# Patient Record
Sex: FEMALE | Race: OTHER | NOT HISPANIC OR LATINO
[De-identification: names, ages, dates, MRNs, and addresses within clinical notes are randomized per-mention and may not be internally consistent; named-entity substitution may affect disease eponyms.]

---

## 2017-04-19 ENCOUNTER — APPOINTMENT (OUTPATIENT)
Dept: CARDIOTHORACIC SURGERY | Facility: CLINIC | Age: 62
End: 2017-04-19

## 2017-04-19 VITALS
SYSTOLIC BLOOD PRESSURE: 123 MMHG | HEART RATE: 76 BPM | BODY MASS INDEX: 28.58 KG/M2 | OXYGEN SATURATION: 97 % | HEIGHT: 69 IN | RESPIRATION RATE: 18 BRPM | TEMPERATURE: 97.1 F | DIASTOLIC BLOOD PRESSURE: 60 MMHG | WEIGHT: 193 LBS

## 2017-10-07 ENCOUNTER — APPOINTMENT (OUTPATIENT)
Dept: CT IMAGING | Facility: CLINIC | Age: 62
End: 2017-10-07

## 2017-10-18 ENCOUNTER — APPOINTMENT (OUTPATIENT)
Dept: CARDIOTHORACIC SURGERY | Facility: CLINIC | Age: 62
End: 2017-10-18
Payer: COMMERCIAL

## 2017-10-18 VITALS
OXYGEN SATURATION: 96 % | RESPIRATION RATE: 18 BRPM | BODY MASS INDEX: 29.54 KG/M2 | SYSTOLIC BLOOD PRESSURE: 127 MMHG | HEART RATE: 78 BPM | TEMPERATURE: 97.8 F | DIASTOLIC BLOOD PRESSURE: 62 MMHG | WEIGHT: 200 LBS

## 2017-10-18 PROCEDURE — 99214 OFFICE O/P EST MOD 30 MIN: CPT

## 2018-10-11 RX ORDER — PREDNISONE 50 MG/1
50 TABLET ORAL
Qty: 3 | Refills: 0 | Status: COMPLETED | COMMUNITY
Start: 2017-10-04 | End: 2018-10-11

## 2018-10-11 RX ORDER — CAMPHOR 0.45 %
25 GEL (GRAM) TOPICAL
Qty: 2 | Refills: 0 | Status: COMPLETED | COMMUNITY
Start: 2017-10-04 | End: 2018-10-11

## 2018-10-24 ENCOUNTER — APPOINTMENT (OUTPATIENT)
Dept: CARDIOTHORACIC SURGERY | Facility: CLINIC | Age: 63
End: 2018-10-24
Payer: COMMERCIAL

## 2018-10-24 VITALS
BODY MASS INDEX: 28.65 KG/M2 | TEMPERATURE: 97 F | HEART RATE: 74 BPM | RESPIRATION RATE: 18 BRPM | DIASTOLIC BLOOD PRESSURE: 64 MMHG | OXYGEN SATURATION: 97 % | SYSTOLIC BLOOD PRESSURE: 115 MMHG | WEIGHT: 194 LBS

## 2018-10-24 PROCEDURE — 99213 OFFICE O/P EST LOW 20 MIN: CPT

## 2019-02-06 ENCOUNTER — APPOINTMENT (OUTPATIENT)
Dept: RHEUMATOLOGY | Facility: CLINIC | Age: 64
End: 2019-02-06

## 2019-02-27 ENCOUNTER — APPOINTMENT (OUTPATIENT)
Dept: RHEUMATOLOGY | Facility: CLINIC | Age: 64
End: 2019-02-27

## 2019-09-05 ENCOUNTER — APPOINTMENT (OUTPATIENT)
Dept: DERMATOLOGY | Facility: CLINIC | Age: 64
End: 2019-09-05
Payer: COMMERCIAL

## 2019-09-05 PROCEDURE — 17110 DESTRUCTION B9 LES UP TO 14: CPT

## 2019-09-05 PROCEDURE — 99203 OFFICE O/P NEW LOW 30 MIN: CPT | Mod: 25

## 2019-09-25 ENCOUNTER — APPOINTMENT (OUTPATIENT)
Dept: DERMATOLOGY | Facility: CLINIC | Age: 64
End: 2019-09-25
Payer: COMMERCIAL

## 2019-09-25 PROCEDURE — 99213 OFFICE O/P EST LOW 20 MIN: CPT | Mod: 25

## 2019-09-25 PROCEDURE — 17250 CHEM CAUT OF GRANLTJ TISSUE: CPT

## 2019-10-30 ENCOUNTER — APPOINTMENT (OUTPATIENT)
Dept: CARDIOTHORACIC SURGERY | Facility: CLINIC | Age: 64
End: 2019-10-30
Payer: COMMERCIAL

## 2019-10-30 VITALS
DIASTOLIC BLOOD PRESSURE: 59 MMHG | HEIGHT: 69 IN | HEART RATE: 82 BPM | TEMPERATURE: 96.3 F | RESPIRATION RATE: 19 BRPM | BODY MASS INDEX: 28.44 KG/M2 | SYSTOLIC BLOOD PRESSURE: 110 MMHG | WEIGHT: 192 LBS | OXYGEN SATURATION: 98 %

## 2019-10-30 PROCEDURE — 99214 OFFICE O/P EST MOD 30 MIN: CPT

## 2019-10-30 RX ORDER — CARBAMAZEPINE 100 MG/1
100 CAPSULE, EXTENDED RELEASE ORAL
Refills: 0 | Status: COMPLETED | COMMUNITY
Start: 2017-10-18 | End: 2019-10-30

## 2019-10-30 RX ORDER — KETOROLAC TROMETHAMINE 4 MG/ML
0.4 SOLUTION/ DROPS OPHTHALMIC TWICE DAILY
Qty: 1 | Refills: 1 | Status: ACTIVE | COMMUNITY
Start: 2019-10-30

## 2019-11-27 ENCOUNTER — APPOINTMENT (OUTPATIENT)
Dept: RHEUMATOLOGY | Facility: CLINIC | Age: 64
End: 2019-11-27

## 2020-06-03 ENCOUNTER — APPOINTMENT (OUTPATIENT)
Dept: OBGYN | Facility: CLINIC | Age: 65
End: 2020-06-03
Payer: COMMERCIAL

## 2020-06-03 VITALS
SYSTOLIC BLOOD PRESSURE: 100 MMHG | HEIGHT: 69 IN | BODY MASS INDEX: 28.29 KG/M2 | DIASTOLIC BLOOD PRESSURE: 50 MMHG | WEIGHT: 191 LBS

## 2020-06-03 DIAGNOSIS — Z01.419 ENCOUNTER FOR GYNECOLOGICAL EXAMINATION (GENERAL) (ROUTINE) W/OUT ABNORMAL FINDINGS: ICD-10-CM

## 2020-06-03 PROCEDURE — 99397 PER PM REEVAL EST PAT 65+ YR: CPT

## 2020-06-03 RX ORDER — CAMPHOR 0.45 %
25 GEL (GRAM) TOPICAL
Qty: 2 | Refills: 0 | Status: DISCONTINUED | COMMUNITY
Start: 2018-10-11 | End: 2020-06-03

## 2020-06-03 NOTE — PHYSICAL EXAM
[Acute Distress] : no acute distress [Alert] : alert [Mass] : no breast mass [Awake] : awake [Axillary LAD] : no axillary lymphadenopathy [Nipple Discharge] : no nipple discharge [Soft] : soft [Tender] : non tender [Oriented x3] : oriented to person, place, and time [Normal] : vagina [Cystocele] : a cystocele [Rectocele] : a rectocele [No Bleeding] : there was no active vaginal bleeding [Absent] : absent [Uterine Adnexae] : were not tender and not enlarged

## 2020-09-08 ENCOUNTER — APPOINTMENT (OUTPATIENT)
Dept: DERMATOLOGY | Facility: CLINIC | Age: 65
End: 2020-09-08

## 2020-10-28 ENCOUNTER — APPOINTMENT (OUTPATIENT)
Dept: CARDIOTHORACIC SURGERY | Facility: CLINIC | Age: 65
End: 2020-10-28
Payer: COMMERCIAL

## 2020-10-28 VITALS
HEIGHT: 69 IN | OXYGEN SATURATION: 95 % | BODY MASS INDEX: 28.58 KG/M2 | RESPIRATION RATE: 18 BRPM | WEIGHT: 193 LBS | DIASTOLIC BLOOD PRESSURE: 60 MMHG | TEMPERATURE: 96.8 F | HEART RATE: 74 BPM | SYSTOLIC BLOOD PRESSURE: 115 MMHG

## 2020-10-28 PROCEDURE — 99072 ADDL SUPL MATRL&STAF TM PHE: CPT

## 2020-10-28 PROCEDURE — 99214 OFFICE O/P EST MOD 30 MIN: CPT

## 2020-11-20 ENCOUNTER — EMERGENCY (EMERGENCY)
Facility: HOSPITAL | Age: 65
LOS: 1 days | Discharge: HOME | End: 2020-11-20
Attending: EMERGENCY MEDICINE
Payer: COMMERCIAL

## 2020-11-20 VITALS
RESPIRATION RATE: 18 BRPM | OXYGEN SATURATION: 96 % | SYSTOLIC BLOOD PRESSURE: 100 MMHG | TEMPERATURE: 98 F | DIASTOLIC BLOOD PRESSURE: 58 MMHG | HEART RATE: 59 BPM

## 2020-11-20 VITALS
TEMPERATURE: 98 F | DIASTOLIC BLOOD PRESSURE: 69 MMHG | WEIGHT: 192.02 LBS | OXYGEN SATURATION: 96 % | RESPIRATION RATE: 18 BRPM | HEIGHT: 70 IN | SYSTOLIC BLOOD PRESSURE: 109 MMHG | HEART RATE: 80 BPM

## 2020-11-20 LAB
ALBUMIN SERPL ELPH-MCNC: 3.7 G/DL — SIGNIFICANT CHANGE UP (ref 3.3–5.2)
ALP SERPL-CCNC: 86 U/L — SIGNIFICANT CHANGE UP (ref 40–120)
ALT FLD-CCNC: 24 U/L — SIGNIFICANT CHANGE UP
ANION GAP SERPL CALC-SCNC: 9 MMOL/L — SIGNIFICANT CHANGE UP (ref 5–17)
APPEARANCE UR: CLEAR — SIGNIFICANT CHANGE UP
AST SERPL-CCNC: 26 U/L — SIGNIFICANT CHANGE UP
BACTERIA # UR AUTO: ABNORMAL
BASOPHILS # BLD AUTO: 0 K/UL — SIGNIFICANT CHANGE UP (ref 0–0.2)
BASOPHILS NFR BLD AUTO: 0 % — SIGNIFICANT CHANGE UP (ref 0–2)
BILIRUB SERPL-MCNC: 0.6 MG/DL — SIGNIFICANT CHANGE UP (ref 0.4–2)
BILIRUB UR-MCNC: NEGATIVE — SIGNIFICANT CHANGE UP
BUN SERPL-MCNC: 14 MG/DL — SIGNIFICANT CHANGE UP (ref 8–20)
CALCIUM SERPL-MCNC: 8.9 MG/DL — SIGNIFICANT CHANGE UP (ref 8.6–10.2)
CHLORIDE SERPL-SCNC: 101 MMOL/L — SIGNIFICANT CHANGE UP (ref 98–107)
CO2 SERPL-SCNC: 28 MMOL/L — SIGNIFICANT CHANGE UP (ref 22–29)
COLOR SPEC: YELLOW — SIGNIFICANT CHANGE UP
CREAT SERPL-MCNC: 0.68 MG/DL — SIGNIFICANT CHANGE UP (ref 0.5–1.3)
DIFF PNL FLD: ABNORMAL
EOSINOPHIL # BLD AUTO: 0 K/UL — SIGNIFICANT CHANGE UP (ref 0–0.5)
EOSINOPHIL NFR BLD AUTO: 0 % — SIGNIFICANT CHANGE UP (ref 0–6)
EPI CELLS # UR: SIGNIFICANT CHANGE UP
GLUCOSE SERPL-MCNC: 108 MG/DL — HIGH (ref 70–99)
GLUCOSE UR QL: NEGATIVE MG/DL — SIGNIFICANT CHANGE UP
HCT VFR BLD CALC: 40 % — SIGNIFICANT CHANGE UP (ref 34.5–45)
HGB BLD-MCNC: 12.5 G/DL — SIGNIFICANT CHANGE UP (ref 11.5–15.5)
KETONES UR-MCNC: NEGATIVE — SIGNIFICANT CHANGE UP
LEUKOCYTE ESTERASE UR-ACNC: NEGATIVE — SIGNIFICANT CHANGE UP
LIDOCAIN IGE QN: 28 U/L — SIGNIFICANT CHANGE UP (ref 22–51)
LYMPHOCYTES # BLD AUTO: 1.23 K/UL — SIGNIFICANT CHANGE UP (ref 1–3.3)
LYMPHOCYTES # BLD AUTO: 9.6 % — LOW (ref 13–44)
MANUAL SMEAR VERIFICATION: SIGNIFICANT CHANGE UP
MCHC RBC-ENTMCNC: 27.2 PG — SIGNIFICANT CHANGE UP (ref 27–34)
MCHC RBC-ENTMCNC: 31.3 GM/DL — LOW (ref 32–36)
MCV RBC AUTO: 87 FL — SIGNIFICANT CHANGE UP (ref 80–100)
MONOCYTES # BLD AUTO: 1.23 K/UL — HIGH (ref 0–0.9)
MONOCYTES NFR BLD AUTO: 9.6 % — SIGNIFICANT CHANGE UP (ref 2–14)
NEUTROPHILS # BLD AUTO: 10.16 K/UL — HIGH (ref 1.8–7.4)
NEUTROPHILS NFR BLD AUTO: 78.2 % — HIGH (ref 43–77)
NEUTS BAND # BLD: 0.9 % — SIGNIFICANT CHANGE UP (ref 0–8)
NITRITE UR-MCNC: NEGATIVE — SIGNIFICANT CHANGE UP
PH UR: 8 — SIGNIFICANT CHANGE UP (ref 5–8)
PLAT MORPH BLD: NORMAL — SIGNIFICANT CHANGE UP
PLATELET # BLD AUTO: 189 K/UL — SIGNIFICANT CHANGE UP (ref 150–400)
POTASSIUM SERPL-MCNC: 3.9 MMOL/L — SIGNIFICANT CHANGE UP (ref 3.5–5.3)
POTASSIUM SERPL-SCNC: 3.9 MMOL/L — SIGNIFICANT CHANGE UP (ref 3.5–5.3)
PROT SERPL-MCNC: 6.7 G/DL — SIGNIFICANT CHANGE UP (ref 6.6–8.7)
PROT UR-MCNC: NEGATIVE MG/DL — SIGNIFICANT CHANGE UP
RBC # BLD: 4.6 M/UL — SIGNIFICANT CHANGE UP (ref 3.8–5.2)
RBC # FLD: 14.4 % — SIGNIFICANT CHANGE UP (ref 10.3–14.5)
RBC BLD AUTO: NORMAL — SIGNIFICANT CHANGE UP
RBC CASTS # UR COMP ASSIST: SIGNIFICANT CHANGE UP /HPF (ref 0–4)
SODIUM SERPL-SCNC: 138 MMOL/L — SIGNIFICANT CHANGE UP (ref 135–145)
SP GR SPEC: 1.01 — SIGNIFICANT CHANGE UP (ref 1.01–1.02)
UROBILINOGEN FLD QL: NEGATIVE MG/DL — SIGNIFICANT CHANGE UP
VARIANT LYMPHS # BLD: 1.7 % — SIGNIFICANT CHANGE UP (ref 0–6)
WBC # BLD: 12.84 K/UL — HIGH (ref 3.8–10.5)
WBC # FLD AUTO: 12.84 K/UL — HIGH (ref 3.8–10.5)
WBC UR QL: NEGATIVE — SIGNIFICANT CHANGE UP

## 2020-11-20 PROCEDURE — 87086 URINE CULTURE/COLONY COUNT: CPT

## 2020-11-20 PROCEDURE — 83690 ASSAY OF LIPASE: CPT

## 2020-11-20 PROCEDURE — 99285 EMERGENCY DEPT VISIT HI MDM: CPT

## 2020-11-20 PROCEDURE — 96375 TX/PRO/DX INJ NEW DRUG ADDON: CPT

## 2020-11-20 PROCEDURE — 85025 COMPLETE CBC W/AUTO DIFF WBC: CPT

## 2020-11-20 PROCEDURE — 80053 COMPREHEN METABOLIC PANEL: CPT

## 2020-11-20 PROCEDURE — 81001 URINALYSIS AUTO W/SCOPE: CPT

## 2020-11-20 PROCEDURE — 74177 CT ABD & PELVIS W/CONTRAST: CPT | Mod: 26

## 2020-11-20 PROCEDURE — 99284 EMERGENCY DEPT VISIT MOD MDM: CPT | Mod: 25

## 2020-11-20 PROCEDURE — 36415 COLL VENOUS BLD VENIPUNCTURE: CPT

## 2020-11-20 PROCEDURE — 74177 CT ABD & PELVIS W/CONTRAST: CPT

## 2020-11-20 PROCEDURE — 96365 THER/PROPH/DIAG IV INF INIT: CPT

## 2020-11-20 PROCEDURE — 96366 THER/PROPH/DIAG IV INF ADDON: CPT

## 2020-11-20 RX ORDER — MORPHINE SULFATE 50 MG/1
4 CAPSULE, EXTENDED RELEASE ORAL ONCE
Refills: 0 | Status: DISCONTINUED | OUTPATIENT
Start: 2020-11-20 | End: 2020-11-20

## 2020-11-20 RX ORDER — SODIUM CHLORIDE 9 MG/ML
1000 INJECTION INTRAMUSCULAR; INTRAVENOUS; SUBCUTANEOUS ONCE
Refills: 0 | Status: COMPLETED | OUTPATIENT
Start: 2020-11-20 | End: 2020-11-20

## 2020-11-20 RX ORDER — CIPROFLOXACIN LACTATE 400MG/40ML
500 VIAL (ML) INTRAVENOUS ONCE
Refills: 0 | Status: DISCONTINUED | OUTPATIENT
Start: 2020-11-20 | End: 2020-11-20

## 2020-11-20 RX ORDER — METRONIDAZOLE 500 MG
1 TABLET ORAL
Qty: 30 | Refills: 0
Start: 2020-11-20 | End: 2020-11-29

## 2020-11-20 RX ORDER — METRONIDAZOLE 500 MG
500 TABLET ORAL ONCE
Refills: 0 | Status: DISCONTINUED | OUTPATIENT
Start: 2020-11-20 | End: 2020-11-20

## 2020-11-20 RX ORDER — DIPHENHYDRAMINE HCL 50 MG
50 CAPSULE ORAL ONCE
Refills: 0 | Status: COMPLETED | OUTPATIENT
Start: 2020-11-20 | End: 2020-11-20

## 2020-11-20 RX ORDER — ONDANSETRON 8 MG/1
4 TABLET, FILM COATED ORAL ONCE
Refills: 0 | Status: COMPLETED | OUTPATIENT
Start: 2020-11-20 | End: 2020-11-20

## 2020-11-20 RX ORDER — CIPROFLOXACIN LACTATE 400MG/40ML
1 VIAL (ML) INTRAVENOUS
Qty: 20 | Refills: 0
Start: 2020-11-20 | End: 2020-11-29

## 2020-11-20 RX ORDER — PIPERACILLIN AND TAZOBACTAM 4; .5 G/20ML; G/20ML
3.38 INJECTION, POWDER, LYOPHILIZED, FOR SOLUTION INTRAVENOUS ONCE
Refills: 0 | Status: COMPLETED | OUTPATIENT
Start: 2020-11-20 | End: 2020-11-20

## 2020-11-20 RX ADMIN — PIPERACILLIN AND TAZOBACTAM 3.38 GRAM(S): 4; .5 INJECTION, POWDER, LYOPHILIZED, FOR SOLUTION INTRAVENOUS at 17:22

## 2020-11-20 RX ADMIN — SODIUM CHLORIDE 1000 MILLILITER(S): 9 INJECTION INTRAMUSCULAR; INTRAVENOUS; SUBCUTANEOUS at 17:22

## 2020-11-20 RX ADMIN — MORPHINE SULFATE 4 MILLIGRAM(S): 50 CAPSULE, EXTENDED RELEASE ORAL at 13:43

## 2020-11-20 RX ADMIN — Medication 50 MILLIGRAM(S): at 17:35

## 2020-11-20 RX ADMIN — Medication 1 TABLET(S): at 22:32

## 2020-11-20 RX ADMIN — SODIUM CHLORIDE 1000 MILLILITER(S): 9 INJECTION INTRAMUSCULAR; INTRAVENOUS; SUBCUTANEOUS at 13:43

## 2020-11-20 RX ADMIN — ONDANSETRON 4 MILLIGRAM(S): 8 TABLET, FILM COATED ORAL at 13:43

## 2020-11-20 RX ADMIN — PIPERACILLIN AND TAZOBACTAM 200 GRAM(S): 4; .5 INJECTION, POWDER, LYOPHILIZED, FOR SOLUTION INTRAVENOUS at 15:59

## 2020-11-20 NOTE — ED STATDOCS - PROGRESS NOTE DETAILS
PT evaluated by intake physician. HPI/PE/ROS as noted above. Will follow up plan per intake physician. Awaiting Ct results. Spoke with [facility] radiology at 9pm and they state that the scan was sent to Dr. Patrice Peña. I called again at 9:30 and they state it was sent to v rad. 2 calls placed to v rad for results. Awaiting to get CT results from v rad. V rad states that Dr. Hewitt is reading the scan now. Dr. Brink spoke with radiologist and states pt has diverticulitis. Will treat with abx. Awaiting read to make sure dissection has not increased . The patient does not want to wait for her CT results and would like to leave against medical advice. I spoke with Dr. Virgen ([facility]) regarding the CT report- I was given verbal report that the patient has acute uncomplicated diverticulitis, however the patient has a history of a AAA with type B dissection and she cannot finalize the report until she looks at old radiographic studies comparing the size of her aorta. I discussed the preliminary result with the patient- she states that she had her aorta checked in October with her vascular surgeon and that it was normal at that time. She has a diagnosis of diverticulitis which explains her pain. Patient informed of risks of leaving prior to CT read including worsening dissection which could be life-threatening and understands risks. The patient has the capacity to refuse further medical evaluation and care. I had a lengthy discussion with the patient in which appropriate further evaluation and treatment was offered to the patient, and they declined. The patient understands that as a consequence of this decision they may be at risk for clinical deterioration including permanent disability and death, and the patient verbalized this understanding. Clear return precautions were discussed. The patient was urged to seek follow-up care, with appropriate referrals made as needed. Cherri Brink DO CT back, stable aorta, uncomplicated diverticulitis. will laron. Cherri Brink DO

## 2020-11-20 NOTE — ED STATDOCS - NSFOLLOWUPINSTRUCTIONS_ED_ALL_ED_FT
1. Follow up with your primary care physician within 2-3days for reevaluation.  2.  Return to the Emergency Department for worsening, progressive or any other concerning symptoms.   3.  Complete your entire course of antibiotics as prescribed. Do not stop taking antibiotics even if your symptoms improve.   4. You were discharged from the hospital prior to your CT results being finalized. There is a chance that there could be a life-threatening intra-abdominal issue that we have not addressed. You are welcome to return to the emergency department at any time to be evaluated.       Diverticulitis    Diverticulitis is inflammation or infection of small pouches in your colon that form when you HAVE a condition called diverticulosis. This condition can range from mild to severe potentially leading to perforation or obstructions of your colon. Symptoms include abdominal pain, fever/chills, nausea, vomiting, diarrhea, constipation, or blood in your stool. If you were prescribed an antibiotic medicine, take it as told by your health care provider. Do not stop taking the antibiotic even if you start to feel better.    SEEK IMMEDIATE MEDICAL CARE IF YOU HAVE ANY OF THE FOLLOWING SYMPTOMS: worsening abdominal pain, high fever, inability to hold down liquids or medication, black or bloody stools, inability to pass gas, lightheadedness/dizziness, or a change in mental status. 1. Follow up with your primary care physician within 2-3days for reevaluation.  2.  Return to the Emergency Department for worsening, progressive or any other concerning symptoms.   3.  Complete your entire course of antibiotics as prescribed. Do not stop taking antibiotics even if your symptoms improve.       Diverticulitis    Diverticulitis is inflammation or infection of small pouches in your colon that form when you HAVE a condition called diverticulosis. This condition can range from mild to severe potentially leading to perforation or obstructions of your colon. Symptoms include abdominal pain, fever/chills, nausea, vomiting, diarrhea, constipation, or blood in your stool. If you were prescribed an antibiotic medicine, take it as told by your health care provider. Do not stop taking the antibiotic even if you start to feel better.    SEEK IMMEDIATE MEDICAL CARE IF YOU HAVE ANY OF THE FOLLOWING SYMPTOMS: worsening abdominal pain, high fever, inability to hold down liquids or medication, black or bloody stools, inability to pass gas, lightheadedness/dizziness, or a change in mental status.

## 2020-11-20 NOTE — ED ADULT NURSE NOTE - NSIMPLEMENTINTERV_GEN_ALL_ED
Implemented All Universal Safety Interventions:  [place] to call system. Call bell, personal items and telephone within reach. Instruct patient to call for assistance. Room bathroom lighting operational. Non-slip footwear when patient is off stretcher. Physically safe environment: no spills, clutter or unnecessary equipment. Stretcher in lowest position, wheels locked, appropriate side rails in place.

## 2020-11-20 NOTE — ED STATDOCS - CLINICAL SUMMARY MEDICAL DECISION MAKING FREE TEXT BOX
54 y,o F with a PMHx of dissecting aortic aneurysm and Marfan's syndrome presents with LLQ abd pain that has been going for the past few days concerning diverticulitis, will obtain labs and CT and will reassess

## 2020-11-20 NOTE — ED STATDOCS - PATIENT PORTAL LINK FT
You can access the FollowMyHealth Patient Portal offered by [facility] by registering at the following website: [email or web address]. By joining [facility]’s FollowMyHealth portal, you will also be able to view your health information using other applications (apps) compatible with our system.

## 2020-11-20 NOTE — ED STATDOCS - PSH
Other postprocedural status  H/O varicose vein stripping  Status post total hysterectomy  H/O: hysterectomy

## 2020-11-20 NOTE — ED STATDOCS - ATTENDING CONTRIBUTION TO CARE
I, Matt Montana, performed the initial face to face bedside interview with this patient regarding history of present illness, review of symptoms and relevant past medical, social and family history.  I completed an independent physical examination.  I was the initial provider who evaluated this patient. I have signed out the follow up of any pending tests (i.e. labs, radiological studies) to the ACP.  I have communicated the patient’s plan of care and disposition with the ACP.  The history, relevant review of systems, past medical and surgical history, medical decision making, and physical examination was documented by the scribe in my presence and I attest to the accuracy of the documentation.

## 2020-11-20 NOTE — ED STATDOCS - PMH
Dissecting aortic aneurysm  type b  Essential hypertension  HTN (hypertension)  History of disorder of nervous system and sense organs  H/O sciatica  Marfan's syndrome  Marfans syndrome  Marfan's syndrome    Mitral valve prolapse    Personal history of other diseases of circulatory system  H/O mitral valve prolapse

## 2020-11-20 NOTE — ED ADULT NURSE NOTE - OBJECTIVE STATEMENT
pt presented to ED alert and oriented x4, c/o LLQ abdominal pain since last night. Pt has extensive HX. MD aware of patients HX. C/o mild nausea and diarrhea. Iv placed labs sent, medications given. Pt with HX of contrast allergy. Patient made aware of plan of care regarding medication administration and wait time for CT. Pt agrees. Will monitor.

## 2020-11-20 NOTE — ED ADULT TRIAGE NOTE - CHIEF COMPLAINT QUOTE
Patient arrived to ED today with c/o left lower abdominal pains.  Patient sent for r/o diverticulitis.

## 2020-11-22 LAB
CULTURE RESULTS: SIGNIFICANT CHANGE UP
SPECIMEN SOURCE: SIGNIFICANT CHANGE UP

## 2020-12-23 PROBLEM — Z01.419 ENCOUNTER FOR GYNECOLOGICAL EXAMINATION: Status: RESOLVED | Noted: 2020-06-03 | Resolved: 2020-12-23

## 2021-06-04 ENCOUNTER — APPOINTMENT (OUTPATIENT)
Dept: OBGYN | Facility: CLINIC | Age: 66
End: 2021-06-04
Payer: MEDICARE

## 2021-06-04 VITALS
WEIGHT: 196.56 LBS | DIASTOLIC BLOOD PRESSURE: 70 MMHG | HEIGHT: 69 IN | SYSTOLIC BLOOD PRESSURE: 118 MMHG | BODY MASS INDEX: 29.11 KG/M2

## 2021-06-04 DIAGNOSIS — Z01.419 ENCOUNTER FOR GYNECOLOGICAL EXAMINATION (GENERAL) (ROUTINE) W/OUT ABNORMAL FINDINGS: ICD-10-CM

## 2021-06-04 DIAGNOSIS — Z87.898 PERSONAL HISTORY OF OTHER SPECIFIED CONDITIONS: ICD-10-CM

## 2021-06-04 DIAGNOSIS — Z00.00 ENCOUNTER FOR GENERAL ADULT MEDICAL EXAMINATION W/OUT ABNORMAL FINDINGS: ICD-10-CM

## 2021-06-04 PROCEDURE — 99397 PER PM REEVAL EST PAT 65+ YR: CPT

## 2021-06-04 PROCEDURE — 99212 OFFICE O/P EST SF 10 MIN: CPT | Mod: 25

## 2021-06-04 PROCEDURE — 99072 ADDL SUPL MATRL&STAF TM PHE: CPT

## 2021-06-04 NOTE — PHYSICAL EXAM
HOD# : 1    No events last night      Complex regional pain syndrome type 1 of right upper extremity    CPRS 1 (complex regional pain syndrome I) of upper limb      Temp:  [97.2 °F (36.2 °C)-98.8 °F (37.1 °C)] 97.9 °F (36.6 °C)  Heart Rate:  [72-93] 72  Resp:  [14-18] 16  BP: ()/(46-72) 105/56  I/O last 3 completed shifts:  In: 1500 [I.V.:1500]  Out: 650 [Urine:600; Blood:50]  No intake/output data recorded.  Vital signs were reviewed and documented in the chart      EXAM   Patient appeared in good neurologic function with normal comprehension   CN grossly intact  Moves all extremities to command  Baseline effort dept weakness in right ue      PLAN     Home today   Fu 2 weeks [place]     [Appropriately responsive] : appropriately responsive [Alert] : alert [No Acute Distress] : no acute distress [Soft] : soft [Non-tender] : non-tender [Non-distended] : non-distended [No HSM] : No HSM [No Lesions] : no lesions [No Mass] : no mass [Oriented x3] : oriented x3 [Examination Of The Breasts] : a normal appearance [No Masses] : no breast masses were palpable [Vulvar Atrophy] : vulvar atrophy [Labia Majora] : normal [Labia Minora] : normal [Normal] : normal [Atrophy] : atrophy [Cystocele] : a cystocele [Rectocele] : a rectocele [Uterine Adnexae] : normal [No Tenderness] : no tenderness [Nl Sphincter Tone] : normal sphincter tone [Absent] : absent

## 2021-06-04 NOTE — HISTORY OF PRESENT ILLNESS
[Menarche Age: ____] : age at menarche was [unfilled] [Menopause Age: ____] : age at menopause was [unfilled] [PGHxTotal] : 4 [BannerxFullTerm] : 2 [PGHxPremature] : 0 [PGHxAbortions] : 2 [[facility]Living] : 2 [PGHxABInduced] : 2 [PGHxABSpont] : 0 [PGHxEctopic] : 0 [PGHxMultBirths] : 0

## 2021-06-07 ENCOUNTER — RESULT CHARGE (OUTPATIENT)
Age: 66
End: 2021-06-07

## 2021-06-07 ENCOUNTER — APPOINTMENT (OUTPATIENT)
Dept: UROGYNECOLOGY | Facility: CLINIC | Age: 66
End: 2021-06-07
Payer: MEDICARE

## 2021-06-07 VITALS
BODY MASS INDEX: 29.03 KG/M2 | SYSTOLIC BLOOD PRESSURE: 114 MMHG | WEIGHT: 196 LBS | HEIGHT: 69 IN | DIASTOLIC BLOOD PRESSURE: 74 MMHG

## 2021-06-07 DIAGNOSIS — Z87.19 PERSONAL HISTORY OF OTHER DISEASES OF THE DIGESTIVE SYSTEM: ICD-10-CM

## 2021-06-07 DIAGNOSIS — R39.13 SPLITTING OF URINARY STREAM: ICD-10-CM

## 2021-06-07 DIAGNOSIS — Z82.3 FAMILY HISTORY OF STROKE: ICD-10-CM

## 2021-06-07 DIAGNOSIS — Z82.49 FAMILY HISTORY OF ISCHEMIC HEART DISEASE AND OTHER DISEASES OF THE CIRCULATORY SYSTEM: ICD-10-CM

## 2021-06-07 DIAGNOSIS — E78.00 PURE HYPERCHOLESTEROLEMIA, UNSPECIFIED: ICD-10-CM

## 2021-06-07 DIAGNOSIS — I72.9 ANEURYSM OF UNSPECIFIED SITE: ICD-10-CM

## 2021-06-07 DIAGNOSIS — Z83.3 FAMILY HISTORY OF DIABETES MELLITUS: ICD-10-CM

## 2021-06-07 DIAGNOSIS — Z79.52 LONG TERM (CURRENT) USE OF SYSTEMIC STEROIDS: ICD-10-CM

## 2021-06-07 DIAGNOSIS — M19.90 UNSPECIFIED OSTEOARTHRITIS, UNSPECIFIED SITE: ICD-10-CM

## 2021-06-07 DIAGNOSIS — Z86.018 PERSONAL HISTORY OF OTHER BENIGN NEOPLASM: ICD-10-CM

## 2021-06-07 DIAGNOSIS — I71.00 DISSECTION OF UNSPECIFIED SITE OF AORTA: ICD-10-CM

## 2021-06-07 LAB
BILIRUB UR QL STRIP: NEGATIVE
GLUCOSE UR-MCNC: NEGATIVE
HCG UR QL: 0.2 EU/DL
HGB UR QL STRIP.AUTO: NORMAL
KETONES UR-MCNC: NEGATIVE
LEUKOCYTE ESTERASE UR QL STRIP: NORMAL
NITRITE UR QL STRIP: NEGATIVE
PH UR STRIP: 5.5
PROT UR STRIP-MCNC: NEGATIVE
SP GR UR STRIP: 1

## 2021-06-07 PROCEDURE — 51701 INSERT BLADDER CATHETER: CPT

## 2021-06-07 PROCEDURE — 99072 ADDL SUPL MATRL&STAF TM PHE: CPT

## 2021-06-07 PROCEDURE — 99204 OFFICE O/P NEW MOD 45 MIN: CPT | Mod: 25

## 2021-06-07 NOTE — LETTER BODY
[Dear  ___] : Dear  [unfilled], [I had the pleasure of evaluating your patient, [unfilled]. Thank you for referring Ms. [unfilled] for consultation for ___] : I had the pleasure of evaluating your patient, [unfilled]. Thank you for referring Ms. [unfilled] for consultation for [unfilled]. [Attached please find my note.] : Attached please find my note. [Thank you very much for allowing me to participate in the care of this patient. If you have any questions, please do not hesitate to contact me] : Thank you very much for allowing me to participate in the care of this patient. If you have any questions, please do not hesitate to contact me. [DrAva  ___] : Dr. RANDOLPH

## 2021-06-07 NOTE — REASON FOR VISIT
[Urinary Incontinence] : urinary incontinence [Pelvic Organ Prolapse] : pelvic organ prolapse [Urine Frequency] : urine frequency

## 2021-06-07 NOTE — PROCEDURE
[Straight Catheterization] : insertion of a straight catheter [Hematuria] : hematuria [No Complications] : no complications [Tolerated Well] : the patient tolerated the procedure well [Post procedure instructions and information given] : Post procedure instructions and information were given and reviewed with patient.

## 2021-06-07 NOTE — OB HISTORY
[Definite ___ (Date)] : the last menstrual period was [unfilled] [Vaginal ___] : [unfilled] vaginal delivery(s) [Last Pap Smear ___] : date of last pap smear was on [unfilled] [Sexually Active] : sexually active [Abnormal Pap Smear] : normal pap smear [Taking Estrogens] : is not taking estrogen replacement [FreeTextEntry1] : Recalls forceps for 1st VD. Largest baby 9#10oz.

## 2021-06-07 NOTE — DISCUSSION/SUMMARY
[FreeTextEntry1] : Ms. MENDEZ has urgency, lekaing, recurrent mid-compartment prolapse, marfans, dissecting aorta and hematuria on clean catch. We discussed that it is hard for me to tell if her prolapse is contributing to her urinary complaints. We discussed what would be involved with a pessary trial to see if it help with her OAB. She has a shortened vagina and attenuated perineal body so I am not sure it will be easy to find a pessary that would fit her but I do think it is something we could consider, certainly before offering a vaginal prolapse repair. We talked about the types of urinary incontinence and the treatment options. We reviewed physical therapy, medication, surgery, vaginal inserts and third line treatments. I recommended bladder testing UDTs and cysto. I sent her urine to the lab. I gave her some literature on pelvic muscle strengthening. I was able to answer all of her questions.\par

## 2021-06-07 NOTE — PHYSICAL EXAM
[Chaperone Present] : A chaperone was present in the examining room during all aspects of the physical examination [No Acute Distress] : in no acute distress [Well developed] : well developed [Well Nourished] : ~L well nourished [Oriented x3] : oriented to person, place, and time [No Edema] : ~T edema was not present [Warm and Dry] : was warm and dry to touch [Normal Gait] : gait was normal [Normal Appearance] : general appearance was normal [Aa ____] : Aa [unfilled] [Ba ____] : Ba [unfilled] [C ____] : C [unfilled] [GH ____] : GH [unfilled] [PB ____] : PB [unfilled] [TVL ____] : TVL  [unfilled] [Ap ____] : Ap [unfilled] [Bp ____] : Bp [unfilled] [D ____] : D [unfilled] [Absent] : absent [Normal] : no abnormalities [Post Void Residual ____ml] : post void residual was [unfilled] ml [Cough] : no cough [Tenderness] : ~T no ~M abdominal tenderness observed [Distended] : not distended [Hernia] : no hernia observed [Scar] : no scars

## 2021-06-07 NOTE — HISTORY OF PRESENT ILLNESS
[FreeTextEntry1] : 66 yo female with complaints of leaking of urine. Has been ongoing for many years. Had vaginal hysterectomy in 2007 and some "stiches" to help with the leaking. At the time was told she had cysts and it was not a good idea to do a sling. The surgery worked well until about 2016 when she started to notice her leaking was coming back. She leaks with urge. She also has urinary frequency, when she has to void about every hour or 2. Denies any leaking with sneeze or cough unless it is a really bad cough. Gets up 1 time early in the morning and will wake up with a wet pad. She does where 1 pad all day but it is because she is close to a bathroom. In the morning it take a while to empty and will be intermittent stream. Then during the day she thinks she empties but she is really not sure.  Denies a vaginal protrusion. It was prior to her hysterectomy. Told by GYN that she does have a prolapse. Some time has bowel urgency and accidents associated with lose bowels. Has marfans. She has glaucoma but unsure if it is open or narrow angle. Has aortic dissection that is being observed and she is followed yearly and it is stable.

## 2021-06-08 LAB
APPEARANCE: CLEAR
BACTERIA: NEGATIVE
BILIRUBIN URINE: NEGATIVE
BLOOD URINE: NEGATIVE
COLOR: COLORLESS
GLUCOSE QUALITATIVE U: NEGATIVE
HYALINE CASTS: 0 /LPF
KETONES URINE: NEGATIVE
LEUKOCYTE ESTERASE URINE: NEGATIVE
MICROSCOPIC-UA: NORMAL
NITRITE URINE: NEGATIVE
PH URINE: 6.5
PROTEIN URINE: NEGATIVE
RED BLOOD CELLS URINE: 1 /HPF
SPECIFIC GRAVITY URINE: 1
SQUAMOUS EPITHELIAL CELLS: 0 /HPF
UROBILINOGEN URINE: NORMAL
WHITE BLOOD CELLS URINE: 0 /HPF

## 2021-06-09 LAB — BACTERIA UR CULT: NORMAL

## 2021-06-11 LAB — URINE CYTOLOGY: NORMAL

## 2021-06-23 ENCOUNTER — APPOINTMENT (OUTPATIENT)
Dept: UROGYNECOLOGY | Facility: CLINIC | Age: 66
End: 2021-06-23
Payer: MEDICARE

## 2021-06-23 PROCEDURE — 51729 CYSTOMETROGRAM W/VP&UP: CPT

## 2021-06-23 PROCEDURE — 51741 ELECTRO-UROFLOWMETRY FIRST: CPT

## 2021-06-23 PROCEDURE — 99072 ADDL SUPL MATRL&STAF TM PHE: CPT

## 2021-06-23 PROCEDURE — 51784 ANAL/URINARY MUSCLE STUDY: CPT

## 2021-06-23 PROCEDURE — 51797 INTRAABDOMINAL PRESSURE TEST: CPT

## 2021-07-06 ENCOUNTER — APPOINTMENT (OUTPATIENT)
Dept: UROGYNECOLOGY | Facility: CLINIC | Age: 66
End: 2021-07-06
Payer: MEDICARE

## 2021-07-06 PROCEDURE — 52000 CYSTOURETHROSCOPY: CPT

## 2021-07-06 PROCEDURE — 99072 ADDL SUPL MATRL&STAF TM PHE: CPT

## 2021-07-06 PROCEDURE — 99213 OFFICE O/P EST LOW 20 MIN: CPT | Mod: 25

## 2021-07-06 NOTE — DISCUSSION/SUMMARY
[FreeTextEntry1] : Options reviewed for her urinary complaints which seem to be predominantly UUI/OAB. We discussed trying a pessary to see if by reducing her prolapse do her urinary symptoms improve. We also discussed medication, PTNS and BTX. She is sexually active so we discussed finding a pessary that she can take in and out. We also discussed that her anatomy may make it is little challenging to find a pessary. I was able to answer all of her questions. She will return for initial pessary. If after a few weeks with pessary in her OAB sx don't resolved consider an alpha agonist as she has history of glaucoma.

## 2021-07-06 NOTE — HISTORY OF PRESENT ILLNESS
[FreeTextEntry1] : Here for cysto. We reviewed UDTs which was normal. She did pad tests that showed leaking with activity and with urge. She has prolapse, is not a great surgical candidate due to her aortic dissection, marfans and glaucoma.

## 2021-07-06 NOTE — REASON FOR VISIT
[Urine Frequency] : urine frequency [Urinary Urgency] : urinary urgency [Urinary Incontinence] : urinary incontinence

## 2021-08-17 ENCOUNTER — APPOINTMENT (OUTPATIENT)
Dept: UROGYNECOLOGY | Facility: CLINIC | Age: 66
End: 2021-08-17
Payer: MEDICARE

## 2021-08-17 DIAGNOSIS — R39.15 URGENCY OF URINATION: ICD-10-CM

## 2021-08-17 DIAGNOSIS — R32 UNSPECIFIED URINARY INCONTINENCE: ICD-10-CM

## 2021-08-17 PROCEDURE — 57160 INSERT PESSARY/OTHER DEVICE: CPT

## 2021-08-24 ENCOUNTER — APPOINTMENT (OUTPATIENT)
Dept: ORTHOPEDIC SURGERY | Facility: CLINIC | Age: 66
End: 2021-08-24

## 2021-10-20 ENCOUNTER — APPOINTMENT (OUTPATIENT)
Dept: CARDIOTHORACIC SURGERY | Facility: CLINIC | Age: 66
End: 2021-10-20
Payer: MEDICARE

## 2021-10-20 VITALS
HEIGHT: 69 IN | RESPIRATION RATE: 17 BRPM | OXYGEN SATURATION: 94 % | SYSTOLIC BLOOD PRESSURE: 118 MMHG | BODY MASS INDEX: 28.44 KG/M2 | DIASTOLIC BLOOD PRESSURE: 58 MMHG | WEIGHT: 192 LBS | HEART RATE: 59 BPM | TEMPERATURE: 97 F

## 2021-10-20 PROCEDURE — 99213 OFFICE O/P EST LOW 20 MIN: CPT

## 2022-01-03 NOTE — ED ADULT TRIAGE NOTE - PAIN RATING/NUMBER SCALE (0-10): REST
Outcome: Left Message    Please transfer to Patient Outreach Team at [phone] when patient returns call.      [facility] Verified: yes    Attempt # 3          
Outcome:pt wife answered and took message and will have him call back to scheduled debby   Please transfer to Patient Outreach Team at [phone] when patient returns call.    Attempt #2  
Pt called in to cancel appointment due to COVID exposure. Verified HIPAA. I cancelled the appointment and advised to call back once her know's results and we can reschedule from there. I gave information about COVID-19 testing sites.   
0

## 2022-10-26 ENCOUNTER — NON-APPOINTMENT (OUTPATIENT)
Age: 67
End: 2022-10-26

## 2022-10-26 ENCOUNTER — APPOINTMENT (OUTPATIENT)
Dept: CARDIOTHORACIC SURGERY | Facility: CLINIC | Age: 67
End: 2022-10-26

## 2022-10-26 VITALS
SYSTOLIC BLOOD PRESSURE: 121 MMHG | DIASTOLIC BLOOD PRESSURE: 63 MMHG | WEIGHT: 187 LBS | OXYGEN SATURATION: 94 % | RESPIRATION RATE: 18 BRPM | TEMPERATURE: 97.2 F | BODY MASS INDEX: 27.7 KG/M2 | HEIGHT: 69 IN | HEART RATE: 57 BPM

## 2022-10-26 DIAGNOSIS — Q87.40 MARFAN'S SYNDROME, UNSPECIFIED: ICD-10-CM

## 2022-10-26 DIAGNOSIS — I71.012 DISSECTION OF DESCENDING THORACIC AORTA: ICD-10-CM

## 2022-10-26 DIAGNOSIS — I71.23 DISSECTION OF DESCENDING THORACIC AORTA: ICD-10-CM

## 2022-10-26 PROCEDURE — 99213 OFFICE O/P EST LOW 20 MIN: CPT

## 2022-10-27 PROBLEM — I71.012 DISSECTING ANEURYSM OF THORACIC AORTA, STANFORD TYPE B: Status: ACTIVE | Noted: 2021-10-20

## 2022-10-30 NOTE — PHYSICAL EXAM
[Sclera] : the sclera and conjunctiva were normal [Neck Appearance] : the appearance of the neck was normal [] : the neck was supple [Respiration, Rhythm And Depth] : normal respiratory rhythm and effort [Exaggerated Use Of Accessory Muscles For Inspiration] : no accessory muscle use [Heart Rate And Rhythm] : heart rate was normal and rhythm regular [Heart Sounds] : normal S1 and S2 [Examination Of The Chest] : the chest was normal in appearance [2+] : left 2+ [No Abnormalities] : the abdominal aorta was not enlarged and no bruit was heard [Bowel Sounds] : normal bowel sounds [Abdomen Soft] : soft [Abnormal Walk] : normal gait [Skin Color & Pigmentation] : normal skin color and pigmentation [No Focal Deficits] : no focal deficits [Oriented To Time, Place, And Person] : oriented to person, place, and time [FreeTextEntry1] : deferred

## 2022-10-30 NOTE — DATA REVIEWED
[FreeTextEntry1] : \par CTA chest, abdomen and pelvis 10/18/18 revealed:\par -There are findings consistent with a type B aortic dissection with the dissection originating within the distal descending thoracic aorta extending into the abdominal aorta and right iliac arterial tree. Both the true lumen and false lumen opacify with contrast. There is patency of the celiac axis, superior mesenteric artery, and renal arteries. Inferior mesenteric artery is patent. 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 Findings such as these can be seen with Marfan syndrome particularly in view of the given history of aortic dissection. Correlation clinically is suggested.\par -Findings consistent with hepatic steatosis are noted.\par -Patient is status post hysterectomy.\par \par Echocardiogram 9/11/18 revealed: LVEF 65%. dilatation of the ascending aorta measuring 3.8cm. mild aortic regurgitation. mild mitral valve prolapse. mild to moderate mitral regurgitation. trace pulmonic regurgitation. mild tricuspid regurgitation. \par \par CTA chest, abdomen and pelvis 10/23/19: stable aortic dissection extending from the distal thoracic aorta through the entire abdominal aorta and into the right common iliac artery ending at the iliac bifurcation associated with aneurysmal dilation. The dilation of the distal descending thoracic aorta with a maximum diameter is 4.4cm. \par \par CTA chest, abdomen and pelvis 10/26/20: distal descending 4.4cm; stable. \par \par TTE 10/3/20:\par mild to moderate MR. mild MVP. EF 65-70%. dilatation of the aortic root and ascending aorta 3.9cm and 3.8cm respectively. \par \par \par \par CTA chest, abdomen and pelvis 10/19/21: \par stable dilatation of the distal descending thoracic aorta and the abdominal aorta associated with dissection flap extending from distal descending thoracic aorta through the bifurcation of the right common iliac artery. stable mild dilatation of right common iliac artery. \par

## 2022-10-30 NOTE — CONSULT LETTER
[Dear  ___] : Dear  [unfilled], [FreeTextEntry2] : Samson Hoff MD\par [address] [place] [address], [address]\[place] [address]\par  [FreeTextEntry1] : I had the pleasure of seeing your patient, SARA MENDEZ, in my office today. We take a multidisciplinary team approach to patient care and consider you, the referring physician, an extension of our team. We will maintain an open line of communication with you throughout your patient's treatment course.  \par \par Ms. MENDEZ presents for one year follow up visit for evaluation and management of type B aortic dissection. I have reviewed all of her medical records and diagnostic images at the time of her office consultation. I have enclosed a copy for your records. \par \par I have reviewed the indications for surgery,and used our webpage [email or web address] <[email or web address]> to illustrate the aorta and anatomy of the heart. The patient does not meet size criteria for surgical intervention at the time. Review of the imaging shows her  aortic pathology has remained stable. Therefore, I have recommended that the patient will require a follow up appointment in one year with CTA to monitor aortic pathology. My office will assist the patient with her upcoming appointment and I will update you on her progress at that time.\par \par I have discussed with the patient that we will continue to monitor her aortic pathology closely at the [facility] at [facility] that encompasses the entire health care system and is one of the largest in the nation at this point.\par \par It is our commitment to provide your patient with the highest quality of advanced therapeutic options. On behalf of the Cardiothoracic Surgery Team, thank you for sending your patient to the [facility] based at [facility].  If there are any questions or concerns, please call me directly at [phone].  \par \par Please see my note below. \par \par Sincerely, \par \par \par \par \par \par Ryan Ardon M.D.\par Professor of Cardiovascular and Thoracic Surgery\par Minimally Invasive Valve Surgeon\par Director of Aortic Surgery, [facility]\par Cell: [phone]\par Email: [email or web address] \par \par [facility]:\par [address], [address] Floor, [place] [address]\par Office: [phone]\par Fax: [phone]\par \par [facility]:\par Department of Cardiovascular and Thoracic Surgery\par [address] [place] [address], [id number]\par Office: [phone]\par Fax: [phone]\par \par Practice Manager: Ms. Hellen Knutson\par Email: [email or web address]\par Phone: [phone]\par

## 2022-10-30 NOTE — ASSESSMENT
[FreeTextEntry1] : 66year old female with a past medical history of Marfan's Syndrome, strong family history of Marfan's syndrome/aortic disease (mother, brother, sister), chronic type B dissection, mitral valve prolapse, presents to the office for a 1 year follow up visit with recent diagnostic imaging. \par \par I have reviewed the patient's medical records, diagnostic images during the time of this office consultation and have made the following recommendation. Review of the imaging shows her  aortic pathology has remained stable and does not require surgical intervention.\par \par Plan\par \par - Follow up in [facility] in one year with CTA chest, abdomen and pelvis. \par - Continue medication regimen.\par - Follow up with cardiologist and PCP.\par - Blood pressure management.\par

## 2022-10-30 NOTE — HISTORY OF PRESENT ILLNESS
[FreeTextEntry1] : 66 year old female with a past medical history of Marfan's Syndrome, strong family history of Marfan's syndrome/aortic disease (mother, brother, sister),  mitral valve prolapse, covid infection 7/2022, DVT to LLE, presents to the office for a 1 year follow up visit with recent diagnostic imaging for evaluation and management of chronic type B dissection.\par \par CTA 10/20/22 stable appearance of the thoracoabdominal aorta with stable dissection in distal descending aorta measuring 4.4cm extending into the left common iliac artery. \par \par Patient is doing well and denies recent hospitalization, ER visits, or surgeries. She  denies fever, chills, fatigue, headache, blurred vision, dizziness, syncope, chest pain, palpitations, shortness of breath, orthopnea, paroxysmal nocturnal dyspnea, nausea, vomiting, abdominal pain, back pain, BRBPR or swelling to legs.\par

## 2022-10-30 NOTE — PROCEDURE
[FreeTextEntry1] : Dr. Ardon reviewed the indications for surgery, and used our webpage [email or web address].org <[email or web address].heartprocedures.org> to illustrate the aorta and anatomy of the heart. Those indications are the following: size greater than 5.0 cm, symptomatic aneurysms, family history of aortic dissection or aneurysm death with a size greater than 4.5 cm, other necessary cardiac procedures such as coronary artery bypass grafting or valvular disorders with an aneurysm greater than 4.5 cm, or connective tissue disorders with an aneurysm size greater than 4.5 cm. The patient does not meet size criteria for surgical intervention at the time.\par \par Dr. Ardon discussed activity restrictions with the patient, and would advise exercise at a moderate amount with no heavy lifting over one third of body weight, and avoiding heart rates that exceed 140 beats per minute. In addition, every patient should abstain from tobacco abuse and to avoid all illicit drug use, especially stimulants such as cocaine or methamphetamine. Dr. Ardon also counseled regarding maintaining a healthy heart diet, and losing any excessive weight as this also put undue stress on both the aorta and entire cardiovascular system. First degree family members should be screened for bicuspid valve disease, and ascending aortic aneurysms. \par \par Patient was advised to view the educational video prior to this visit regarding aortic pathology, risk factors, surgical procedures, and lifestyle modifications. Video can be retrieved at [email or web address]/watch?[email or web address].\par

## 2022-10-30 NOTE — END OF VISIT
[Time Spent: ___ minutes] : I have spent [unfilled] minutes of time on the encounter. [FreeTextEntry3] : \par I, TRACY KIRKU , am scribing for and in the presence of JAMAL VEE the following sections: History of present illness, past Medical/family/surgical/family/social history, review of systems, vital signs, physical exam and disposition.\par \par I personally performed the services described in the documentation, reviewed the documentation recorded by the scribe in my presence and it accurately and completely records my words and actions.\par \par

## 2023-02-27 NOTE — ED STATDOCS - CHPI ED LATERALITY
left Cantharidin Pregnancy And Lactation Text: The use of this medication during pregnancy or lactation is not recommended as there is insufficient data.

## 2023-03-24 ENCOUNTER — TRANSCRIPTION ENCOUNTER (OUTPATIENT)
Age: 68
End: 2023-03-24

## 2023-03-24 ENCOUNTER — APPOINTMENT (OUTPATIENT)
Dept: OTOLARYNGOLOGY | Facility: CLINIC | Age: 68
End: 2023-03-24
Payer: MEDICARE

## 2023-03-24 VITALS
WEIGHT: 187 LBS | HEIGHT: 69 IN | HEART RATE: 62 BPM | SYSTOLIC BLOOD PRESSURE: 127 MMHG | BODY MASS INDEX: 27.7 KG/M2 | DIASTOLIC BLOOD PRESSURE: 72 MMHG

## 2023-03-24 PROCEDURE — 31231 NASAL ENDOSCOPY DX: CPT

## 2023-03-24 PROCEDURE — 99204 OFFICE O/P NEW MOD 45 MIN: CPT | Mod: 25

## 2023-03-24 RX ORDER — RIVAROXABAN 2.5 MG/1
2.5 TABLET, FILM COATED ORAL
Refills: 0 | Status: COMPLETED | COMMUNITY
Start: 2022-10-27 | End: 2023-03-24

## 2023-03-24 RX ORDER — PREDNISONE 50 MG/1
50 TABLET ORAL
Qty: 3 | Refills: 2 | Status: COMPLETED | COMMUNITY
Start: 2020-10-21 | End: 2023-03-24

## 2023-03-24 RX ORDER — PHENAZOPYRIDINE HYDROCHLORIDE 200 MG/1
200 TABLET ORAL
Qty: 6 | Refills: 0 | Status: COMPLETED | COMMUNITY
Start: 2021-06-23 | End: 2023-03-24

## 2023-03-24 RX ORDER — MOMETASONE FUROATE 1 MG/ML
0.1 SOLUTION TOPICAL
Qty: 60 | Refills: 2 | Status: ACTIVE | COMMUNITY
Start: 2023-03-24 | End: 1900-01-01

## 2023-03-24 NOTE — PROCEDURE
[FreeTextEntry6] : Bilateral nasal endoscopy:\par \par Left:\par Septum midline\par Mild inferior turbinate hypertrophy \par Middle meatus clear, without masses, polyps, or pus\par Previous partial ethmoidectomy\par No edema, no mucus\par Sphenoethmoidal recess clear, without masses, polyps, or pus\par \par Right: \par Septum deviated right\par Mild inferior turbinate hypertrophy\par Middle meatus clear, without masses, polyps, or pus \par Sphenoethmoidal recess clear, without masses, polyps, or pus but not very well visaulized\par \par

## 2023-03-24 NOTE — HISTORY OF PRESENT ILLNESS
[de-identified] : 67F with altered taste/smell since 2017. 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 Had been diagnosed with chronic sinusitis and previously tried flonase, but does not report much improvement. Admits to hoarseness and throat clearing especially in the AM. Denies rhinorrhea, postnasal drip, nasal discharge, headaches, sinus pain/pressure, cough, sore throat. Had polyps removed as a child

## 2023-03-24 NOTE — PHYSICAL EXAM
[FreeTextEntry1] : Marfanoid [Nasal Endoscopy Performed] : nasal endoscopy was performed, see procedure section for findings [Normal] :  tongue is normal

## 2023-03-24 NOTE — REASON FOR VISIT
[Initial Evaluation] : an initial evaluation for [FreeTextEntry2] : Referred by Dr. Jaimes for altered sense of smell/taste and possible sinusitis

## 2023-03-24 NOTE — CONSULT LETTER
[Consult Letter:] : I had the pleasure of evaluating your patient, [unfilled]. [Please see my note below.] : Please see my note below. [Consult Closing:] : Thank you very much for allowing me to participate in the care of this patient.  If you have any questions, please do not hesitate to contact me. [Sincerely,] : Sincerely, [FreeTextEntry2] : Dr. Jaimes [FreeTextEntry3] : Ivan Mcclain MD, [facility] \par Otolaryngology \par Sinus and Endoscopic Skull Base Surgery \par Head and Neck Surgery \par \par [address] [facility], [facility] [address] \[place] [address] \par \par [address] [place] [address], \[place] [address] \par Tel: [phone] \par Fax:[phone]\par

## 2023-05-25 ENCOUNTER — APPOINTMENT (OUTPATIENT)
Dept: OTOLARYNGOLOGY | Facility: CLINIC | Age: 68
End: 2023-05-25
Payer: MEDICARE

## 2023-05-25 VITALS
HEART RATE: 72 BPM | WEIGHT: 187 LBS | SYSTOLIC BLOOD PRESSURE: 126 MMHG | BODY MASS INDEX: 27.7 KG/M2 | HEIGHT: 69 IN | DIASTOLIC BLOOD PRESSURE: 72 MMHG

## 2023-05-25 DIAGNOSIS — R43.0 ANOSMIA: ICD-10-CM

## 2023-05-25 PROCEDURE — 31231 NASAL ENDOSCOPY DX: CPT

## 2023-05-25 PROCEDURE — 99213 OFFICE O/P EST LOW 20 MIN: CPT | Mod: 25

## 2023-05-25 NOTE — HISTORY OF PRESENT ILLNESS
[de-identified] : 67F presents for a follow up for poor sense of smell, taste, and phantosmia x3-4 years. Patient used saline irrigations with mometasone without relief. Reports sinus pressure. Reports sinus pressure, anterior rhinorrhea, PND x1 week, says her seasonal allergies have been acting up.  Denies nasal congestion. She reports MRI in 2017 when smell issues began which was normal.\par

## 2023-05-25 NOTE — PROCEDURE
[FreeTextEntry6] : Procedure: Bilateral nasal endoscopy (CPT 72023) \par \par Indication: Anterior rhinoscopy was inadequate to evaluate pathology.\par \par Left:\par Septum midline\par Mild inferior turbinate hypertrophy \par Middle meatus clear, without masses, polyps, or pus\par Sphenoethmoidal recess clear, without masses, polyps, or pus\par Olfactory groove clear without edema\par \par Right: \par Septum midline\par Mild inferior turbinate hypertrophy\par Adhesions at MT, MM, but no edema\par Sphenoethmoidal recess clear, without masses, polyps, or pus\par High septal deflection limiting view of olfactory groove, but visible portion clear\par

## 2023-05-25 NOTE — ASSESSMENT
[FreeTextEntry1] : 67F presents for altered sense of smell and phantosmia, tried DDM x6 weeks without benefit.

## 2023-05-25 NOTE — PHYSICAL EXAM
[Normal] : palatine tonsils are normal [Midline] : trachea located in midline position [Nasal Endoscopy Performed] : nasal endoscopy was performed, see procedure section for findings

## 2023-06-14 ENCOUNTER — APPOINTMENT (OUTPATIENT)
Dept: OBGYN | Facility: CLINIC | Age: 68
End: 2023-06-14
Payer: MEDICARE

## 2023-06-14 VITALS
HEIGHT: 69 IN | BODY MASS INDEX: 28.29 KG/M2 | WEIGHT: 191 LBS | SYSTOLIC BLOOD PRESSURE: 118 MMHG | DIASTOLIC BLOOD PRESSURE: 70 MMHG

## 2023-06-14 DIAGNOSIS — N81.6 CYSTOCELE, UNSPECIFIED: ICD-10-CM

## 2023-06-14 DIAGNOSIS — Z86.69 PERSONAL HISTORY OF OTHER DISEASES OF THE NERVOUS SYSTEM AND SENSE ORGANS: ICD-10-CM

## 2023-06-14 DIAGNOSIS — Z86.39 PERSONAL HISTORY OF OTHER ENDOCRINE, NUTRITIONAL AND METABOLIC DISEASE: ICD-10-CM

## 2023-06-14 DIAGNOSIS — Z86.718 PERSONAL HISTORY OF OTHER VENOUS THROMBOSIS AND EMBOLISM: ICD-10-CM

## 2023-06-14 DIAGNOSIS — Z13.820 ENCOUNTER FOR SCREENING FOR OSTEOPOROSIS: ICD-10-CM

## 2023-06-14 DIAGNOSIS — Z01.419 ENCOUNTER FOR GYNECOLOGICAL EXAMINATION (GENERAL) (ROUTINE) W/OUT ABNORMAL FINDINGS: ICD-10-CM

## 2023-06-14 DIAGNOSIS — Z78.0 ASYMPTOMATIC MENOPAUSAL STATE: ICD-10-CM

## 2023-06-14 DIAGNOSIS — Z12.39 ENCOUNTER FOR OTHER SCREENING FOR MALIGNANT NEOPLASM OF BREAST: ICD-10-CM

## 2023-06-14 DIAGNOSIS — N81.10 CYSTOCELE, UNSPECIFIED: ICD-10-CM

## 2023-06-14 PROCEDURE — 99397 PER PM REEVAL EST PAT 65+ YR: CPT

## 2023-06-14 NOTE — PHYSICAL EXAM
[Chaperone Present] : A chaperone was present in the examining room during all aspects of the physical examination [Appropriately responsive] : appropriately responsive [Alert] : alert [No Acute Distress] : no acute distress [Soft] : soft [Non-tender] : non-tender [Non-distended] : non-distended [No HSM] : No HSM [No Lesions] : no lesions [No Mass] : no mass [Oriented x3] : oriented x3 [Examination Of The Breasts] : a normal appearance [No Masses] : no breast masses were palpable [Vulvar Atrophy] : vulvar atrophy [Labia Minora] : normal [Labia Majora] : normal [Normal] : normal [Atrophy] : atrophy [Cystocele] : a cystocele [Rectocele] : a rectocele [Absent] : absent [Uterine Adnexae] : normal [No Tenderness] : no tenderness [Nl Sphincter Tone] : normal sphincter tone

## 2023-06-14 NOTE — HISTORY OF PRESENT ILLNESS
[Y] : Patient is sexually active [Menarche Age: ____] : age at menarche was [unfilled] [Menopause Age: ____] : age at menopause was [unfilled] [PGHxTotal] : 4 [[facility]] : 2 [PGHxPremature] : 0 [PGHxAbortions] : 2 [[facility]] : 2 [PGHxABInduced] : 2 [PGHxABSpont] : 0 [PGHxEctopic] : 0 [PGHxMultBirths] : 0

## 2023-06-16 DIAGNOSIS — R21 RASH AND OTHER NONSPECIFIC SKIN ERUPTION: ICD-10-CM

## 2023-06-16 RX ORDER — NYSTATIN 100000 [USP'U]/G
100000 CREAM TOPICAL TWICE DAILY
Qty: 2 | Refills: 6 | Status: ACTIVE | COMMUNITY
Start: 2023-06-16 | End: 1900-01-01

## 2023-06-16 RX ORDER — NYSTATIN AND TRIAMCINOLONE ACETONIDE 100000; 1 MG/G; MG/G
100000-0.1 CREAM TOPICAL 3 TIMES DAILY
Qty: 2 | Refills: 3 | Status: DISCONTINUED | COMMUNITY
Start: 2020-10-13 | End: 2023-06-16

## 2023-10-16 RX ORDER — CAMPHOR 0.45 %
25 GEL (GRAM) TOPICAL
Qty: 2 | Refills: 2 | Status: ACTIVE | COMMUNITY
Start: 2020-10-21 | End: 1900-01-01

## 2023-10-16 RX ORDER — PREDNISONE 50 MG/1
50 TABLET ORAL
Qty: 3 | Refills: 1 | Status: ACTIVE | COMMUNITY
Start: 2023-10-16 | End: 1900-01-01

## 2023-10-21 ENCOUNTER — NON-APPOINTMENT (OUTPATIENT)
Age: 68
End: 2023-10-21

## 2023-10-25 ENCOUNTER — APPOINTMENT (OUTPATIENT)
Dept: CARDIOTHORACIC SURGERY | Facility: CLINIC | Age: 68
End: 2023-10-25
Payer: MEDICARE

## 2023-10-25 VITALS
HEART RATE: 70 BPM | BODY MASS INDEX: 28.29 KG/M2 | RESPIRATION RATE: 18 BRPM | DIASTOLIC BLOOD PRESSURE: 55 MMHG | WEIGHT: 191 LBS | HEIGHT: 69 IN | OXYGEN SATURATION: 96 % | TEMPERATURE: 97.5 F | SYSTOLIC BLOOD PRESSURE: 117 MMHG

## 2023-10-25 DIAGNOSIS — Q87.40 MARFAN'S SYNDROME, UNSPECIFIED: ICD-10-CM

## 2023-10-25 DIAGNOSIS — I71.012 DISSECTION OF DESCENDING THORACIC AORTA: ICD-10-CM

## 2023-10-25 PROCEDURE — 99213 OFFICE O/P EST LOW 20 MIN: CPT

## 2024-02-09 ENCOUNTER — RX RENEWAL (OUTPATIENT)
Age: 69
End: 2024-02-09

## 2024-02-09 RX ORDER — TRIAMCINOLONE ACETONIDE 1 MG/G
0.1 CREAM TOPICAL TWICE DAILY
Qty: 15 | Refills: 0 | Status: ACTIVE | COMMUNITY
Start: 2023-06-16 | End: 1900-01-01

## 2024-08-28 ENCOUNTER — APPOINTMENT (OUTPATIENT)
Dept: OTOLARYNGOLOGY | Facility: CLINIC | Age: 69
End: 2024-08-28
Payer: MEDICARE

## 2024-08-28 VITALS
HEART RATE: 70 BPM | SYSTOLIC BLOOD PRESSURE: 121 MMHG | WEIGHT: 191 LBS | HEIGHT: 69 IN | DIASTOLIC BLOOD PRESSURE: 60 MMHG | BODY MASS INDEX: 28.29 KG/M2

## 2024-08-28 DIAGNOSIS — H65.01 ACUTE SEROUS OTITIS MEDIA, RIGHT EAR: ICD-10-CM

## 2024-08-28 DIAGNOSIS — H69.91 UNSPECIFIED EUSTACHIAN TUBE DISORDER, RIGHT EAR: ICD-10-CM

## 2024-08-28 PROCEDURE — 99213 OFFICE O/P EST LOW 20 MIN: CPT

## 2024-08-28 RX ORDER — FLUTICASONE PROPIONATE 50 UG/1
50 SPRAY, METERED NASAL DAILY
Qty: 1 | Refills: 2 | Status: ACTIVE | COMMUNITY
Start: 2024-08-28 | End: 1900-01-01

## 2024-08-28 RX ORDER — AZELASTINE HYDROCHLORIDE 137 UG/1
SPRAY, METERED NASAL
Refills: 0 | Status: ACTIVE | COMMUNITY

## 2024-08-28 NOTE — PHYSICAL EXAM
[de-identified] : serous fluid with air bubble present [de-identified] : mild congestion of mucous membranes [Midline] : trachea located in midline position [Normal] : no neck adenopathy

## 2024-08-28 NOTE — HISTORY OF PRESENT ILLNESS
[de-identified] : 68 year old female presents for dysphonia and cough that began 8/11/2024. Pt reports the following weekend she was seen at Urgent Care with right ear pain, sinus pressure, left eye discharge. Pt prescribed and completed course of amoxicillin, azelastine nasal spray, and ofloxacin eye drops. Pt states left eye symptoms have resolved, cough is resolved, dysphonia is mostly resolved, and left ear continues to have pressure, muffled hearing, tinnitus and pt feels off balance at times. Patient denies otalgia, otorrhea, dizziness, vertigo.

## 2024-08-28 NOTE — ASSESSMENT
[FreeTextEntry1] : Right JOSELINE after URI.  Start Flonase with valsava post administration.  Seek attention for otalgia, otorrhea, bleeding, headache, hearing loss, dizziness or vertigo.  Followup 1 month

## 2024-08-28 NOTE — CONSULT LETTER
[Dear  ___] : Dear  [unfilled], [Courtesy Letter:] : I had the pleasure of seeing your patient, [unfilled], in my office today. [Please see my note below.] : Please see my note below. [Sincerely,] : Sincerely, [FreeTextEntry2] : Dr. Angelo Jaimes [FreeTextEntry3] : Ric Blancas MD Otolaryngology at [address], [address] [place] [address] Phone: [phone] Fax: [phone]

## 2024-09-25 ENCOUNTER — APPOINTMENT (OUTPATIENT)
Dept: OTOLARYNGOLOGY | Facility: CLINIC | Age: 69
End: 2024-09-25
Payer: MEDICARE

## 2024-09-25 DIAGNOSIS — H93.13 TINNITUS, BILATERAL: ICD-10-CM

## 2024-09-25 DIAGNOSIS — H69.91 UNSPECIFIED EUSTACHIAN TUBE DISORDER, RIGHT EAR: ICD-10-CM

## 2024-09-25 PROCEDURE — 99212 OFFICE O/P EST SF 10 MIN: CPT

## 2024-09-25 NOTE — PHYSICAL EXAM
[Midline] : trachea located in midline position [Normal] : salivary glands are normal [de-identified] : normal voice

## 2024-09-25 NOTE — HISTORY OF PRESENT ILLNESS
[de-identified] : Update 9/25/2024: 68 year old female presents for f/u for dysphonia and cough. Pt continues to use Flonase daily with relief, pt states she no longer hears her voice in her head, but still has constant tinnitus. Cough and dysphonia resolved after last visit with therapy.   68 year old female presents for dysphonia and cough that began 8/11/2024. Pt reports the following weekend she was seen at Urgent Care with right ear pain, sinus pressure, left eye discharge. Pt prescribed and completed course of amoxicillin, azelastine nasal spray, and ofloxacin eye drops. Pt states left eye symptoms have resolved, cough is resolved, dysphonia is mostly resolved, and left ear continues to have pressure, muffled hearing, tinnitus and pt feels off balance at times. Patient denies otalgia, otorrhea, dizziness, vertigo.

## 2024-09-25 NOTE — ASSESSMENT
[FreeTextEntry1] : JOSELINE, cough and voice improved.  Check Audiogram.  May discontinue Flonase.  Seek attention for otalgia, otorrhea, bleeding, headache, hearing loss, dizziness or vertigo. Seek attention for hemoptysis, bleeding, dysphagia, SOB/difficulty breathing, significant cough, voice change greater than 2 weeks, throat pain, fever, chills, neck swelling, redness of neck.  Followup 3 months

## 2024-09-25 NOTE — CONSULT LETTER
[Dear  ___] : Dear  [unfilled], [Courtesy Letter:] : I had the pleasure of seeing your patient, [unfilled], in my office today. [Please see my note below.] : Please see my note below. [Sincerely,] : Sincerely, [FreeTextEntry2] : Dr. Angelo Jaimes [FreeTextEntry3] : Ric Blancas MD Otolaryngology at [address], [address] [place] [address] Phone: [phone] Fax: [phone]

## 2024-10-15 ENCOUNTER — NON-APPOINTMENT (OUTPATIENT)
Age: 69
End: 2024-10-15

## 2024-10-23 ENCOUNTER — APPOINTMENT (OUTPATIENT)
Dept: CARDIOTHORACIC SURGERY | Facility: CLINIC | Age: 69
End: 2024-10-23

## 2024-10-23 ENCOUNTER — NON-APPOINTMENT (OUTPATIENT)
Age: 69
End: 2024-10-23

## 2024-10-23 ENCOUNTER — APPOINTMENT (OUTPATIENT)
Dept: CARDIOTHORACIC SURGERY | Facility: CLINIC | Age: 69
End: 2024-10-23
Payer: MEDICARE

## 2024-10-23 VITALS
HEART RATE: 88 BPM | TEMPERATURE: 97.7 F | BODY MASS INDEX: 27.63 KG/M2 | DIASTOLIC BLOOD PRESSURE: 59 MMHG | OXYGEN SATURATION: 96 % | WEIGHT: 193 LBS | SYSTOLIC BLOOD PRESSURE: 121 MMHG | HEIGHT: 70 IN

## 2024-10-23 DIAGNOSIS — I72.9 ANEURYSM OF UNSPECIFIED SITE: ICD-10-CM

## 2024-10-23 PROCEDURE — 99214 OFFICE O/P EST MOD 30 MIN: CPT

## 2024-10-30 ENCOUNTER — APPOINTMENT (OUTPATIENT)
Dept: CARDIOTHORACIC SURGERY | Facility: CLINIC | Age: 69
End: 2024-10-30

## 2025-02-15 ENCOUNTER — INPATIENT (INPATIENT)
Facility: HOSPITAL | Age: 70
LOS: 1 days | Discharge: HOME HEALTH | End: 2025-02-17
Attending: HOSPITALIST | Admitting: HOSPITALIST
Payer: MEDICARE

## 2025-02-15 ENCOUNTER — EMERGENCY (EMERGENCY)
Facility: HOSPITAL | Age: 70
LOS: 1 days | Discharge: OTHER INSTITUTION | End: 2025-02-15
Attending: EMERGENCY MEDICINE
Payer: MEDICARE

## 2025-02-15 VITALS
RESPIRATION RATE: 18 BRPM | HEART RATE: 88 BPM | SYSTOLIC BLOOD PRESSURE: 160 MMHG | OXYGEN SATURATION: 98 % | DIASTOLIC BLOOD PRESSURE: 68 MMHG

## 2025-02-15 VITALS
DIASTOLIC BLOOD PRESSURE: 87 MMHG | TEMPERATURE: 98 F | RESPIRATION RATE: 20 BRPM | OXYGEN SATURATION: 100 % | HEART RATE: 80 BPM | HEIGHT: 70 IN | WEIGHT: 190.92 LBS | SYSTOLIC BLOOD PRESSURE: 133 MMHG

## 2025-02-15 VITALS
OXYGEN SATURATION: 99 % | DIASTOLIC BLOOD PRESSURE: 78 MMHG | RESPIRATION RATE: 17 BRPM | HEART RATE: 87 BPM | SYSTOLIC BLOOD PRESSURE: 145 MMHG | TEMPERATURE: 97 F

## 2025-02-15 DIAGNOSIS — S05.30XA OCULAR LACERATION WITHOUT PROLAPSE OR LOSS OF INTRAOCULAR TISSUE, UNSPECIFIED EYE, INITIAL ENCOUNTER: ICD-10-CM

## 2025-02-15 LAB
ALBUMIN SERPL ELPH-MCNC: 4 G/DL — SIGNIFICANT CHANGE UP (ref 3.3–5.2)
ALP SERPL-CCNC: 77 U/L — SIGNIFICANT CHANGE UP (ref 40–120)
ALT FLD-CCNC: 18 U/L — SIGNIFICANT CHANGE UP
ANION GAP SERPL CALC-SCNC: 13 MMOL/L — SIGNIFICANT CHANGE UP (ref 5–17)
AST SERPL-CCNC: 26 U/L — SIGNIFICANT CHANGE UP
BILIRUB SERPL-MCNC: 0.3 MG/DL — LOW (ref 0.4–2)
BLD GP AB SCN SERPL QL: SIGNIFICANT CHANGE UP
BUN SERPL-MCNC: 16.1 MG/DL — SIGNIFICANT CHANGE UP (ref 8–20)
CALCIUM SERPL-MCNC: 9.3 MG/DL — SIGNIFICANT CHANGE UP (ref 8.4–10.5)
CHLORIDE SERPL-SCNC: 102 MMOL/L — SIGNIFICANT CHANGE UP (ref 96–108)
CO2 SERPL-SCNC: 24 MMOL/L — SIGNIFICANT CHANGE UP (ref 22–29)
CREAT SERPL-MCNC: 0.64 MG/DL — SIGNIFICANT CHANGE UP (ref 0.5–1.3)
EGFR: 95 ML/MIN/1.73M2 — SIGNIFICANT CHANGE UP
EGFR: 95 ML/MIN/1.73M2 — SIGNIFICANT CHANGE UP
GLUCOSE SERPL-MCNC: 104 MG/DL — HIGH (ref 70–99)
HCT VFR BLD CALC: 40.1 % — SIGNIFICANT CHANGE UP (ref 34.5–45)
HGB BLD-MCNC: 12.8 G/DL — SIGNIFICANT CHANGE UP (ref 11.5–15.5)
INR BLD: 1 RATIO — SIGNIFICANT CHANGE UP (ref 0.85–1.16)
MCHC RBC-ENTMCNC: 27.5 PG — SIGNIFICANT CHANGE UP (ref 27–34)
MCHC RBC-ENTMCNC: 31.9 G/DL — LOW (ref 32–36)
MCV RBC AUTO: 86.2 FL — SIGNIFICANT CHANGE UP (ref 80–100)
NRBC # BLD AUTO: 0 K/UL — SIGNIFICANT CHANGE UP (ref 0–0)
NRBC # FLD: 0 K/UL — SIGNIFICANT CHANGE UP (ref 0–0)
NRBC BLD AUTO-RTO: 0 /100 WBCS — SIGNIFICANT CHANGE UP (ref 0–0)
PLATELET # BLD AUTO: 216 K/UL — SIGNIFICANT CHANGE UP (ref 150–400)
PMV BLD: 9.9 FL — SIGNIFICANT CHANGE UP (ref 7–13)
POTASSIUM SERPL-MCNC: 3.7 MMOL/L — SIGNIFICANT CHANGE UP (ref 3.5–5.3)
POTASSIUM SERPL-SCNC: 3.7 MMOL/L — SIGNIFICANT CHANGE UP (ref 3.5–5.3)
PROT SERPL-MCNC: 6.5 G/DL — LOW (ref 6.6–8.7)
PROTHROM AB SERPL-ACNC: 11.6 SEC — SIGNIFICANT CHANGE UP (ref 9.9–13.4)
RBC # BLD: 4.65 M/UL — SIGNIFICANT CHANGE UP (ref 3.8–5.2)
RBC # FLD: 13.9 % — SIGNIFICANT CHANGE UP (ref 10.3–14.5)
SODIUM SERPL-SCNC: 139 MMOL/L — SIGNIFICANT CHANGE UP (ref 135–145)
TROPONIN T, HIGH SENSITIVITY RESULT: 8 NG/L — SIGNIFICANT CHANGE UP (ref 0–51)
WBC # BLD: 7.97 K/UL — SIGNIFICANT CHANGE UP (ref 3.8–10.5)
WBC # FLD AUTO: 7.97 K/UL — SIGNIFICANT CHANGE UP (ref 3.8–10.5)

## 2025-02-15 PROCEDURE — 12011 RPR F/E/E/N/L/M 2.5 CM/<: CPT

## 2025-02-15 PROCEDURE — 70486 CT MAXILLOFACIAL W/O DYE: CPT | Mod: 26

## 2025-02-15 PROCEDURE — 90471 IMMUNIZATION ADMIN: CPT

## 2025-02-15 PROCEDURE — 70450 CT HEAD/BRAIN W/O DYE: CPT | Mod: MC

## 2025-02-15 PROCEDURE — 86850 RBC ANTIBODY SCREEN: CPT

## 2025-02-15 PROCEDURE — 99291 CRITICAL CARE FIRST HOUR: CPT | Mod: 25

## 2025-02-15 PROCEDURE — 90714 TD VACC NO PRESV 7 YRS+ IM: CPT

## 2025-02-15 PROCEDURE — 86900 BLOOD TYPING SEROLOGIC ABO: CPT

## 2025-02-15 PROCEDURE — 72125 CT NECK SPINE W/O DYE: CPT | Mod: MC

## 2025-02-15 PROCEDURE — 85610 PROTHROMBIN TIME: CPT

## 2025-02-15 PROCEDURE — 96374 THER/PROPH/DIAG INJ IV PUSH: CPT

## 2025-02-15 PROCEDURE — 80053 COMPREHEN METABOLIC PANEL: CPT

## 2025-02-15 PROCEDURE — 70486 CT MAXILLOFACIAL W/O DYE: CPT | Mod: MC

## 2025-02-15 PROCEDURE — 70450 CT HEAD/BRAIN W/O DYE: CPT | Mod: 26

## 2025-02-15 PROCEDURE — 86901 BLOOD TYPING SEROLOGIC RH(D): CPT

## 2025-02-15 PROCEDURE — 84484 ASSAY OF TROPONIN QUANT: CPT

## 2025-02-15 PROCEDURE — 36415 COLL VENOUS BLD VENIPUNCTURE: CPT

## 2025-02-15 PROCEDURE — 72125 CT NECK SPINE W/O DYE: CPT | Mod: 26

## 2025-02-15 PROCEDURE — 99285 EMERGENCY DEPT VISIT HI MDM: CPT | Mod: 25

## 2025-02-15 PROCEDURE — 85027 COMPLETE CBC AUTOMATED: CPT

## 2025-02-15 PROCEDURE — 99223 1ST HOSP IP/OBS HIGH 75: CPT

## 2025-02-15 RX ORDER — CEFAZOLIN SODIUM IN 0.9 % NACL 3 G/100 ML
1000 INTRAVENOUS SOLUTION, PIGGYBACK (ML) INTRAVENOUS ONCE
Refills: 0 | Status: DISCONTINUED | OUTPATIENT
Start: 2025-02-15 | End: 2025-02-15

## 2025-02-15 RX ORDER — TETRACAINE HYDROCHLORIDE 5 MG/ML
1 SOLUTION OPHTHALMIC ONCE
Refills: 0 | Status: COMPLETED | OUTPATIENT
Start: 2025-02-15 | End: 2025-02-15

## 2025-02-15 RX ORDER — CEFAZOLIN SODIUM IN 0.9 % NACL 3 G/100 ML
1000 INTRAVENOUS SOLUTION, PIGGYBACK (ML) INTRAVENOUS EVERY 8 HOURS
Refills: 0 | Status: DISCONTINUED | OUTPATIENT
Start: 2025-02-15 | End: 2025-02-15

## 2025-02-15 RX ORDER — CLOSTRIDIUM TETANI TOXOID ANTIGEN (FORMALDEHYDE INACTIVATED), CORYNEBACTERIUM DIPHTHERIAE TOXOID ANTIGEN (FORMALDEHYDE INACTIVATED), BORDETELLA PERTUSSIS TOXOID ANTIGEN (GLUTARALDEHYDE INACTIVATED), BORDETELLA PERTUSSIS FILAMENTOUS HEMAGGLUTININ ANTIGEN (FORMALDEHYDE INACTIVATED), BORDETELLA PERTUSSIS PERTACTIN ANTIGEN, AND BORDETELLA PERTUSSIS FIMBRIAE 2/3 ANTIGEN 5; 2; 2.5; 5; 3; 5 [LF]/.5ML; [LF]/.5ML; UG/.5ML; UG/.5ML; UG/.5ML; UG/.5ML
0.5 INJECTION, SUSPENSION INTRAMUSCULAR ONCE
Refills: 0 | Status: COMPLETED | OUTPATIENT
Start: 2025-02-15 | End: 2025-02-15

## 2025-02-15 RX ORDER — CEFAZOLIN SODIUM IN 0.9 % NACL 3 G/100 ML
INTRAVENOUS SOLUTION, PIGGYBACK (ML) INTRAVENOUS
Refills: 0 | Status: DISCONTINUED | OUTPATIENT
Start: 2025-02-15 | End: 2025-02-23

## 2025-02-15 RX ORDER — ACETAMINOPHEN 500 MG/5ML
650 LIQUID (ML) ORAL EVERY 6 HOURS
Refills: 0 | Status: DISCONTINUED | OUTPATIENT
Start: 2025-02-15 | End: 2025-02-17

## 2025-02-15 RX ORDER — CEFTAZIDIME 100 MG/ML
INJECTION, POWDER, FOR SOLUTION INTRAMUSCULAR; INTRAVENOUS
Refills: 0 | Status: DISCONTINUED | OUTPATIENT
Start: 2025-02-15 | End: 2025-02-16

## 2025-02-15 RX ORDER — LIDOCAINE/RACEPINEP/TETRACAINE 4-0.05-0.5
1 GEL WITH PREFILLED APPLICATOR (ML) TOPICAL ONCE
Refills: 0 | Status: COMPLETED | OUTPATIENT
Start: 2025-02-15 | End: 2025-02-15

## 2025-02-15 RX ORDER — VANCOMYCIN HCL IN 5 % DEXTROSE 1.5G/250ML
1250 PLASTIC BAG, INJECTION (ML) INTRAVENOUS EVERY 12 HOURS
Refills: 0 | Status: DISCONTINUED | OUTPATIENT
Start: 2025-02-16 | End: 2025-02-16

## 2025-02-15 RX ORDER — CEFAZOLIN SODIUM IN 0.9 % NACL 3 G/100 ML
INTRAVENOUS SOLUTION, PIGGYBACK (ML) INTRAVENOUS
Refills: 0 | Status: DISCONTINUED | OUTPATIENT
Start: 2025-02-15 | End: 2025-02-15

## 2025-02-15 RX ORDER — CEFTAZIDIME 100 MG/ML
2 INJECTION, POWDER, FOR SOLUTION INTRAMUSCULAR; INTRAVENOUS EVERY 8 HOURS
Refills: 0 | Status: DISCONTINUED | OUTPATIENT
Start: 2025-02-16 | End: 2025-02-16

## 2025-02-15 RX ORDER — FLUORESCEIN SODIUM 0.6 MG
1 STRIP OPHTHALMIC (EYE) ONCE
Refills: 0 | Status: COMPLETED | OUTPATIENT
Start: 2025-02-15 | End: 2025-02-15

## 2025-02-15 RX ORDER — CEFAZOLIN SODIUM IN 0.9 % NACL 3 G/100 ML
1000 INTRAVENOUS SOLUTION, PIGGYBACK (ML) INTRAVENOUS EVERY 8 HOURS
Refills: 0 | Status: DISCONTINUED | OUTPATIENT
Start: 2025-02-15 | End: 2025-02-23

## 2025-02-15 RX ORDER — CEFAZOLIN SODIUM IN 0.9 % NACL 3 G/100 ML
1000 INTRAVENOUS SOLUTION, PIGGYBACK (ML) INTRAVENOUS ONCE
Refills: 0 | Status: COMPLETED | OUTPATIENT
Start: 2025-02-15 | End: 2025-02-15

## 2025-02-15 RX ORDER — CEFTAZIDIME 100 MG/ML
2 INJECTION, POWDER, FOR SOLUTION INTRAMUSCULAR; INTRAVENOUS ONCE
Refills: 0 | Status: DISCONTINUED | OUTPATIENT
Start: 2025-02-15 | End: 2025-02-16

## 2025-02-15 RX ORDER — VANCOMYCIN HCL IN 5 % DEXTROSE 1.5G/250ML
1000 PLASTIC BAG, INJECTION (ML) INTRAVENOUS ONCE
Refills: 0 | Status: COMPLETED | OUTPATIENT
Start: 2025-02-15 | End: 2025-02-15

## 2025-02-15 RX ADMIN — Medication 1000 MILLIGRAM(S): at 15:47

## 2025-02-15 RX ADMIN — Medication 250 MILLIGRAM(S): at 18:50

## 2025-02-15 RX ADMIN — Medication 1 APPLICATION(S): at 20:18

## 2025-02-15 NOTE — CHART NOTE - NSCHARTNOTEFT_GEN_A_CORE
HPI: 69F Marfan syndrome, glaucoma, Rt lens dislocation - chronically blind, p/w mech fall w/ loss of vision to Lt eye at [facility], transferred to [facility] ER for opthal eval    Interval History: pt fell, glasses shattered. LP vision at baseline OD iso glaucoma. OS previously patient's only functional eye. 1996 OS PCIOL 2014 PCIOL OD. "glaucoma implant" OD     General: AAO x 3, appropriate mood and affect    Ophthalmology Exam:  Visual acuity (sc): LP OD LP OS  Pupils JESUS irregular   Ttono: 6 OD 4 OS   Extraocular movements (EOMs): OS -1 supraduction   Confrontational Visual Field (CVF): JESUS OU  Color Plates: JESUS OU     Pen Light Exam (PLE)  External: Flat OU  Lids/Lashes/Lacrimal Ducts: mild lid swelling OU    Sclera/Conjunctiva: superior bleb with scleromalacia OD bullous EDWIN 360 with inferotemporal sparing near limbus. Superonasal hematoma OS  Cornea: severe haze with conjunctivalization OD    Anterior Chamber: JESUS iso haze OD  clotted nasal heme OS  Iris: irregular OD superiorly displaced OS   Lens: PCIOL OD 1 piece lens in AC OU    Unable to DFE iso heme OS           Assessment and Recommendation:   · Assessment	  Assessment and Recommendations:  69y female with a past medical history/ocular history of glaucoma OD, PCIOL OU, marfan's syndrome consulted for traumatic eye injury found to have possible globe rupture requiring exploration     #Trauma OS  #Bullous subconj Heme OS  #Hyphema OS  - Fell on floor and glasses broke giving her facial laceration  - Previously 20/25 OS per patient   - LP OU, pupils irregular OU  - IOP 4 OS, baseline pre prior records is 6 in left eye  - Raised subcong heme Supranasally, however patient has hx of thin bleb in left eye.   - Hyphema with 40% of globe OS  - Hard shield at all times  - Tetanus shot if not already given   - Will opt for observation vs surgery at this time. Patient is high risk given marfan hx and very thin slera OU. No overt signs of rupture. AC well formed, Bruce negative over cornea and bleb, IOP at baseline IOP.   - Patient can eat at this time and be discharge in the morning. Patient will need to return to eye clinic at noon for evaluation.   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   Patient will need to be seen for repeat exam tomorrow either in clinic if she is discharged or in hospital if she is still at [facility].     [address] [facility]  [address]  Dr. Pitt

## 2025-02-15 NOTE — H&P ADULT - NSHPREVIEWOFSYSTEMS_GEN_ALL_CORE
REVIEW OF SYSTEMS:    CONSTITUTIONAL: No weakness, fevers or chills  EYES/ENT: No visual changes; No dysphagia; No sore throat; No rhinorrhea; No sinus pain/pressure  NECK: No pain or stiffness  RESPIRATORY: No cough, wheezing, hemoptysis; No shortness of breath  CARDIOVASCULAR: No chest pain or palpitations; No lower extremity edema  GASTROINTESTINAL: No abdominal or epigastric pain. No nausea, vomiting, or hematemesis; No diarrhea or constipation. No melena or hematochezia.  GENITOURINARY: No dysuria, frequency or hematuria  NEUROLOGICAL: No numbness, paresthesias, or weakness; No HA; No LH/dizziness  MSK: ambulates with  SKIN: No itching, burning, rashes, or lesions   All other review of systems is negative unless indicated above. REVIEW OF SYSTEMS:    CONSTITUTIONAL: No weakness, fevers or chills  EYES/ENT: (+) L eye vision loss; No dysphagia; No sore throat; No rhinorrhea; No sinus pain/pressure  NECK: No pain or stiffness  RESPIRATORY: No cough, wheezing, hemoptysis; No shortness of breath  CARDIOVASCULAR: No chest pain or palpitations; No lower extremity edema  GASTROINTESTINAL: No abdominal or epigastric pain. No nausea, vomiting, or hematemesis; No diarrhea or constipation. No melena or hematochezia.  GENITOURINARY: No dysuria, frequency or hematuria  NEUROLOGICAL: No numbness, paresthesias, or weakness; No HA; No LH/dizziness  MSK: ambulates without assist  SKIN: No itching, burning, rashes, or lesions   All other review of systems is negative unless indicated above.

## 2025-02-15 NOTE — ED ADULT TRIAGE NOTE - CHIEF COMPLAINT QUOTE
xfer from [facility] for optho- dx with L ruptured globe s/p mechanical fall today- hx bind in R eye, marfans, HTN

## 2025-02-15 NOTE — H&P ADULT - ASSESSMENT
69F Marfan syndrome, glaucoma, Rt lens dislocation - chronically blind, p/w mech fall w/ loss of vision to Lt eye at [facility], transferred to [facility] ER for opthal eval   69F Marfan syndrome, glaucoma, Rt lens dislocation - chronically blind who intially presented s/p mech fall w/ loss of vision to L eye to [facility], there found to have L orbital floor blowout fracture with c/f possible ruptured globe, transferred to [facility] ER for ophtho eval.   69F Marfan syndrome, glaucoma, HTN, HLD, Rt lens dislocation - chronically blind who intially presented s/p mech fall w/ loss of vision to L eye to [facility], there found to have L orbital floor blowout fracture with c/f possible ruptured globe, transferred to [facility] ER for ophtho eval.

## 2025-02-15 NOTE — H&P ADULT - HISTORY OF PRESENT ILLNESS
69F Marfan syndrome, glaucoma, Rt lens dislocation - chronically blind, p/w mech fall w/ loss of vision to Lt eye at [facility], transferred to [facility] ER for opthal eval    ED Course: 69F Marfan syndrome, glaucoma, Rt lens dislocation - chronically blind, p/w mech fall w/ loss of vision to Lt eye at [facility], transferred to [facility] ER for opthal eval.    Patient states L eye is only functioning eye and this morning fell breaking her glasses incurring laceration under L eye.  Notes spots in eye, no pain w/ EOMs.  Presented to [facility] where imaging was notable for L orbital floor fracture and given c/f globe rupture was transferred here for ophtho eval.      ED Course:  AFVSS.  Labs largely unremarkable.  CT head/maxillofacial/cspine from [facility] showing L orbital floor blowout fracture.  Lac repaired at bedside.  Optho and OMFS consulted.  Given vanc/ceftazidime, tetanus booster.   69F Marfan syndrome, glaucoma, HTN, HLD, Rt lens dislocation - chronically blind, p/w mech fall w/ loss of vision to Lt eye at [facility], transferred to [facility] ER for opthal eval.    Patient states L eye is only functioning eye at baseline.  Reports this morning lost her footing and onto her face breaking her glasses and incurring laceration under L eye.  Notes spots in eye, no pain w/ EOMs, however reports vision loss in eye noted after event.  Presented to [facility] where imaging was notable for L orbital floor fracture and given c/f globe rupture was transferred here for ophtho eval.      ED Course:  AFVSS.  Labs largely unremarkable.  CT head/maxillofacial/cspine from [facility] showing L orbital floor blowout fracture.  Lac repaired at bedside.  Optho and OMFS consulted.  Given vanc/ceftazidime, tetanus booster.

## 2025-02-15 NOTE — H&P ADULT - NSHPPHYSICALEXAM_GEN_ALL_CORE
PHYSICAL EXAM:  GENERAL: NAD  HEAD:  Atraumatic, Normocephalic  EYES: EOMI, PERRL, conjunctiva and sclera clear  NECK: Supple, No JVD  CHEST/LUNG: Clear to auscultation bilaterally; No wheezes, rales or rhonchi; normal work of breathing, speaking in full sentences  HEART: Regular rate and rhythm; No murmurs, rubs, or gallops, (+)S1, S2  ABDOMEN: Soft, Nontender, Nondistended; Normal Bowel sounds   EXTREMITIES:  2+ Peripheral Pulses, No clubbing, cyanosis, or edema  PSYCH: normal mood and affect, A&Ox3  NEUROLOGY: no focal neuro deficits  SKIN: No rashes or lesions PHYSICAL EXAM:  GENERAL: elderly woman, anxious appearing  HEAD:  Atraumatic, Normocephalic  EYES: L periorbital edema s/p lac repair, L subconjunctival hemorrhage  NECK: Supple, No JVD  CHEST/LUNG: Clear to auscultation bilaterally; No wheezes, rales or rhonchi; normal work of breathing, speaking in full sentences  HEART: Regular rate and rhythm; No murmurs, rubs, or gallops, (+)S1, S2  ABDOMEN: Soft, Nontender, Nondistended; Normal Bowel sounds   EXTREMITIES:  2+ Peripheral Pulses, No clubbing, cyanosis, or edema  PSYCH: normal mood and affect, A&Ox3  NEUROLOGY: no focal neuro deficits  SKIN: No rashes or lesions

## 2025-02-15 NOTE — ED PROCEDURE NOTE - CPROC ED COMPLICATIONS1
no

## 2025-02-15 NOTE — CONSULT NOTE ADULT - SUBJECTIVE AND OBJECTIVE BOX
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 She reports falling and breaking her glasses.     Left eye is only functional eye. Patient fell and broke her glasses and now have laceration under left eye on the face and ED and ophthalmology report concerns of a laceration of the left eye.  Concern was for globe rupture therefore was transferred here for ophthalmology evaluation. Patient reports left eye has blurry vision and reduced vision. Reports spots in vision. She reports no pain with EOMs    OMFS was consulted for evaluation of left orbital floor fracture. Patient was evaluated by OMFS resident on call. At the time of the exam the patient was AAOx3 and in no acute pain or distress. The patient acted as the informant        PAST MEDICAL HISTORY:  Dissecting aortic aneurysm type b  Essential hypertension HTN (hypertension)  History of disorder of nervous system and sense organs H/O sciatica  Marfan's syndrome   Marfan's syndrome Marfans syndrome  Mitral valve prolapse     PAST SURGICAL HISTORY:  Other postprocedural status H/O varicose vein stripping  Status post total hysterectomy H/O: hysterectomy.     FAMILY HISTORY:  Family history of cardiovascular disease, family hx of marfan's mother, sister, brother.    ALLERGIES AND HOME MEDICATIONS:   Allergies:  NKDA, intolerance to iodine   ?     Objective     Vitals:     Vital Signs Last 24 Hrs  T(C): 36.9 (15 Feb 2025 19:57), Max: 36.9 (15 Feb 2025 19:57)  T(F): 98.5 (15 Feb 2025 19:57), Max: 98.5 (15 Feb 2025 19:57)  HR: 71 (15 Feb 2025 19:57) (71 - 88)  BP: 129/67 (15 Feb 2025 19:57) (129/67 - 160/68)  BP(mean): 84 (15 Feb 2025 19:57) (84 - 84)  RR: 18 (15 Feb 2025 19:57) (17 - 20)  SpO2: 100% (15 Feb 2025 19:57) (98% - 100%)    Parameters below as of 15 Feb 2025 19:57  Patient On (Oxygen Delivery Method): room air        I&O's Detail    Medications:    MEDICATIONS  (STANDING):    MEDICATIONS  (PRN):      Labs:                          12.8   7.97  )-----------( 216      ( 15 Feb 2025 15:00 )             40.1       02-15    139  |  102  |  16.1  ----------------------------<  104[H]  3.7   |  24.0  |  0.64    Ca    9.3      15 Feb 2025 15:00    TPro  6.5[L]  /  Alb  4.0  /  TBili  0.3[L]  /  DBili  x   /  AST  26  /  ALT  18  /  AlkPhos  77  02-15  ?     Physical Exam     ?     Extraoral Exam:     H: Normocephalic. CN V1, V2, V3 grossly intact bilaterally (+pinprick, +brush stroke) with no evidence of CN7 nerve weakness. Inferior border of the mandible is palpable bilaterally. U shaped 4 cm laceration to left zygomatic region (ED states they will be repairing laceration)    E: EOMI. Left sided periorbital edema, subconjunctival hemorrhage, ~40% hyphema, periorbital ecchymosis, irregular position of pupil  noted.       E: No otorrhea or Maurer's sign.      N: Nasal dorsum is midline with no cosmetic deformity. No edema, mobility or crepitus to nasal dorsum. Nares patent bilaterally with no rhinorrhea, active epistaxis or septal hematoma. Dried blood in left naris.   ?   T: Trachea is midline with no palpable masses and no lymphadenopathy.     ?  Intraoral Exam     LUMA >35mm with no deflection or deviation of the mandible upon opening or closing with no TMJ clicking or popping.  Removed maxillary RPD for evaluation. Maxilla and mandible are stable with no segmental mobility. No intraoral lacerations, abrasions, or contusions. No evidence of acute dentoalveolar trauma with no luxated or fractured teeth.   ?  No FOM elevation and no sublingual hematoma. No pigmented, ulcerative or exophytic lesion. Airway is patent and intact. No Nikolay's sign and no vestibular ecchymosis with no tenderness to palpation. Fair oral hygiene with no sign of infection.   ?   Radiographic Exam   CT facial bones:  -Left orbital floor blowout fracture. No rectus muscle herniation.   Hemorrhage in the left maxillary sinus.  ?  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 Patient sustained left orbital floor blowout fracture and possible left globe laceration    Recommendations:  - No acute OMFS surgical intervention, will re-evaluate after ophthalmology manages globe and after resolution of edema   - Pain management as per primary team    - Antibiotics per opthalmology   - Sinus precautions (no nose blowing, no sucking on straws, sneeze with mouth open)   - OMFS to follow while inpatient  - Patient to follow up in outpatient [facility] clinic (Dr. 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 She reports falling and breaking her glasses.     Left eye is only functional eye. Patient fell and broke her glasses and now have laceration under left eye on the face and ED and ophthalmology report concerns of a laceration of the left eye.  Concern was for globe rupture therefore was transferred here for ophthalmology evaluation. Patient reports left eye has blurry vision and reduced vision. Reports spots in vision. She reports no pain with EOMs    OMFS was consulted for evaluation of left orbital floor fracture. Patient was evaluated by OMFS resident on call. At the time of the exam the patient was AAOx3 and in no acute pain or distress. The patient acted as the informant        PAST MEDICAL HISTORY:  Dissecting aortic aneurysm type b  Essential hypertension HTN (hypertension)  History of disorder of nervous system and sense organs H/O sciatica  Marfan's syndrome   Marfan's syndrome Marfans syndrome  Mitral valve prolapse     PAST SURGICAL HISTORY:  Other postprocedural status H/O varicose vein stripping  Status post total hysterectomy H/O: hysterectomy.     FAMILY HISTORY:  Family history of cardiovascular disease, family hx of marfan's mother, sister, brother.    ALLERGIES AND HOME MEDICATIONS:   Allergies:  NKDA, intolerance to iodine   ?     Objective     Vitals:     Vital Signs Last 24 Hrs  T(C): 36.9 (15 Feb 2025 19:57), Max: 36.9 (15 Feb 2025 19:57)  T(F): 98.5 (15 Feb 2025 19:57), Max: 98.5 (15 Feb 2025 19:57)  HR: 71 (15 Feb 2025 19:57) (71 - 88)  BP: 129/67 (15 Feb 2025 19:57) (129/67 - 160/68)  BP(mean): 84 (15 Feb 2025 19:57) (84 - 84)  RR: 18 (15 Feb 2025 19:57) (17 - 20)  SpO2: 100% (15 Feb 2025 19:57) (98% - 100%)    Parameters below as of 15 Feb 2025 19:57  Patient On (Oxygen Delivery Method): room air        I&O's Detail    Medications:    MEDICATIONS  (STANDING):    MEDICATIONS  (PRN):      Labs:                          12.8   7.97  )-----------( 216      ( 15 Feb 2025 15:00 )             40.1       02-15    139  |  102  |  16.1  ----------------------------<  104[H]  3.7   |  24.0  |  0.64    Ca    9.3      15 Feb 2025 15:00    TPro  6.5[L]  /  Alb  4.0  /  TBili  0.3[L]  /  DBili  x   /  AST  26  /  ALT  18  /  AlkPhos  77  02-15  ?     Physical Exam     ?     Extraoral Exam:     H: Normocephalic. CN V1, V2, V3 grossly intact bilaterally (+pinprick, +brush stroke) with no evidence of CN7 nerve weakness. Inferior border of the mandible is palpable bilaterally. U shaped 4 cm laceration to left zygomatic region (ED states they will be repairing laceration)    E: EOMI. Left sided periorbital edema, subconjunctival hemorrhage, ~40% hyphema, periorbital ecchymosis, irregular position of pupil  noted.       E: No otorrhea or Maurer's sign.      N: Nasal dorsum is midline with no cosmetic deformity. No edema, mobility or crepitus to nasal dorsum. Nares patent bilaterally with no rhinorrhea, active epistaxis or septal hematoma. Dried blood in left naris.   ?   T: Trachea is midline with no palpable masses and no lymphadenopathy.     ?  Intraoral Exam     LUMA >35mm with no deflection or deviation of the mandible upon opening or closing with no TMJ clicking or popping.  Removed maxillary RPD for evaluation. Maxilla and mandible are stable with no segmental mobility. No intraoral lacerations, abrasions, or contusions. No evidence of acute dentoalveolar trauma with no luxated or fractured teeth.   ?  No FOM elevation and no sublingual hematoma. No pigmented, ulcerative or exophytic lesion. Airway is patent and intact. No Nikolay's sign and no vestibular ecchymosis with no tenderness to palpation. Fair oral hygiene with no sign of infection.   ?   Radiographic Exam   CT facial bones:  -Left orbital floor blowout fracture. No rectus muscle herniation.   Hemorrhage in the left maxillary sinus.  ?  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 Patient sustained left orbital floor blowout fracture and possible left globe laceration    Recommendations:  - No acute OMFS surgical intervention, will re-evaluate after ophthalmology manages globe and after resolution of edema   - Pain management as per primary team    - Antibiotics per ophthalmology   - Sinus precautions (no nose blowing, no sucking on straws, sneeze with mouth open)   - OMFS to follow while inpatient  - Patient to follow up in outpatient [facility] clinic (Dr. Lynne Ospina) and call [phone] to schedule if discharged  - Final plan pending discussion with attending       Alberto Jenkins   Oral and Maxillofacial Surgery   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 She reports falling and breaking her glasses.     Left eye is only functional eye. Patient fell and broke her glasses and now have laceration under left eye on the face and ED and ophthalmology report concerns of a laceration of the left eye.  Concern was for globe rupture therefore was transferred here for ophthalmology evaluation. Patient reports left eye has blurry vision and reduced vision. Reports spots in vision. She reports no pain with EOMs    OMFS was consulted for evaluation of left orbital floor fracture. Patient was evaluated by OMFS resident on call. At the time of the exam the patient was AAOx3 and in no acute pain or distress. The patient acted as the informant        PAST MEDICAL HISTORY:  Dissecting aortic aneurysm type b  Essential hypertension HTN (hypertension)  History of disorder of nervous system and sense organs H/O sciatica  Marfan's syndrome   Marfan's syndrome Marfans syndrome  Mitral valve prolapse     PAST SURGICAL HISTORY:  Other postprocedural status H/O varicose vein stripping  Status post total hysterectomy H/O: hysterectomy.     FAMILY HISTORY:  Family history of cardiovascular disease, family hx of marfan's mother, sister, brother.    ALLERGIES AND HOME MEDICATIONS:   Allergies:  NKDA, intolerance to iodine   ?     Objective     Vitals:     Vital Signs Last 24 Hrs  T(C): 36.9 (15 Feb 2025 19:57), Max: 36.9 (15 Feb 2025 19:57)  T(F): 98.5 (15 Feb 2025 19:57), Max: 98.5 (15 Feb 2025 19:57)  HR: 71 (15 Feb 2025 19:57) (71 - 88)  BP: 129/67 (15 Feb 2025 19:57) (129/67 - 160/68)  BP(mean): 84 (15 Feb 2025 19:57) (84 - 84)  RR: 18 (15 Feb 2025 19:57) (17 - 20)  SpO2: 100% (15 Feb 2025 19:57) (98% - 100%)    Parameters below as of 15 Feb 2025 19:57  Patient On (Oxygen Delivery Method): room air        I&O's Detail    Medications:    MEDICATIONS  (STANDING):    MEDICATIONS  (PRN):      Labs:                          12.8   7.97  )-----------( 216      ( 15 Feb 2025 15:00 )             40.1       02-15    139  |  102  |  16.1  ----------------------------<  104[H]  3.7   |  24.0  |  0.64    Ca    9.3      15 Feb 2025 15:00    TPro  6.5[L]  /  Alb  4.0  /  TBili  0.3[L]  /  DBili  x   /  AST  26  /  ALT  18  /  AlkPhos  77  02-15  ?     Physical Exam     ?     Extraoral Exam:     H: Normocephalic. CN V1, V2, V3 grossly intact bilaterally (+pinprick, +brush stroke) with no evidence of CN7 nerve weakness. Inferior border of the mandible is palpable bilaterally. U shaped 4 cm laceration to left zygomatic region (ED states they will be repairing laceration)    E: EOMI. Left sided periorbital edema, subconjunctival hemorrhage, ~40% hyphema, periorbital ecchymosis, irregular position of pupil  noted.       E: No otorrhea or Maurer's sign.      N: Nasal dorsum is midline with no cosmetic deformity. No edema, mobility or crepitus to nasal dorsum. Nares patent bilaterally with no rhinorrhea, active epistaxis or septal hematoma. Dried blood in left naris.   ?   T: Trachea is midline with no palpable masses and no lymphadenopathy.     ?  Intraoral Exam     LUMA >35mm with no deflection or deviation of the mandible upon opening or closing with no TMJ clicking or popping.  Removed maxillary RPD for evaluation. Maxilla and mandible are stable with no segmental mobility. No intraoral lacerations, abrasions, or contusions. No evidence of acute dentoalveolar trauma with no luxated or fractured teeth.   ?  No FOM elevation and no sublingual hematoma. No pigmented, ulcerative or exophytic lesion. Airway is patent and intact. No Nikolay's sign and no vestibular ecchymosis with no tenderness to palpation. Fair oral hygiene with no sign of infection.   ?   Radiographic Exam   CT facial bones:  -Left orbital floor blowout fracture. No rectus muscle herniation.   Hemorrhage in the left maxillary sinus.  ?  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 Patient sustained left orbital floor blowout fracture and possible left globe laceration    Recommendations:  - No acute OMFS surgical intervention, will re-evaluate after ophthalmology manages globe and after resolution of edema   - Pain management as per primary team    - Antibiotics per ophthalmology   - Sinus precautions (no nose blowing, no sucking on straws, sneeze with mouth open)   - OMFS to follow while inpatient  - [facility] and call [phone] to schedule if discharged       Alberto Jenkins   Oral and Maxillofacial Surgery   [facility][phone]   [facility] Pager: [phone]   [facility] Pager: [phone]   Available on Teams

## 2025-02-15 NOTE — ED PROVIDER NOTE - NSICDXFAMILYHX_GEN_ALL_CORE_FT
FAMILY HISTORY:  Family history of cardiovascular disease, family hx of marfan's mother, sister, brother

## 2025-02-15 NOTE — ED PROVIDER NOTE - NSICDXPASTMEDICALHX_GEN_ALL_CORE_FT
PAST MEDICAL HISTORY:  Dissecting aortic aneurysm type b    Essential hypertension HTN (hypertension)    History of disorder of nervous system and sense organs H/O sciatica    Marfan's syndrome     Marfan's syndrome Marfans syndrome    Mitral valve prolapse     Personal history of other diseases of circulatory system H/O mitral valve prolapse

## 2025-02-15 NOTE — ED ADULT NURSE REASSESSMENT NOTE - NS ED NURSE REASSESS COMMENT FT1
Pt. received from day AALIYAH Cramer. Pt. A&OX3 and ambulatory with assist. Pt. noted to have superficial lacerations and one laceration to the left forehead above the eyebrow. Swelling noted to the left side of the face. Left eye covering placed by opthalmology. Pt. states having metal to the left ankle and right wrist. Comfort measures provided, safety maintained throughout.

## 2025-02-15 NOTE — ED ADULT NURSE NOTE - OBJECTIVE STATEMENT
Patient received to room #1, A&OX4, ambulatory with assist, English speaking, Hx. of blindness in right eye, HTN, and Marfan's, transfer to the ED from [facility] for Optomology consult. Patient reports diagnosis of left ruptured globe s/p mechanical fall today. Patient endorses tripping and falling on a concrete parking lot and hitting the left eye socket. Patient denies fevers, chills, chest pain, sob, headache, dizziness, abdominal pain, n/v/d, and urinary symptoms. Patient endorses visual impairment, and mild pain to left side of face. Respirations even, unlabored, and clear in all fields, S1 AND S2 heart sounds heard on ascultation, abdomen soft, nondistended, nontender, no neuro deficits noted, scraps and bruises noted to hands bilaterally and left wrist, labs drawn and sent, medicated as ordered, care plan continued, comfort measures provided, safety maintained.

## 2025-02-15 NOTE — CONSULT NOTE ADULT - SUBJECTIVE AND OBJECTIVE BOX
[facility] DEPARTMENT OF OPHTHALMOLOGY - INITIAL ADULT CONSULT  -----------------------------------------------------------------------------  Diony King MD, PGY-2  Contact: TEAMS  -----------------------------------------------------------------------------    HPI:    Interval History: ***    PMH: ***  POcHx: denies surg/laser  FH: denies glc/amd  Social History: denies etoh/tobacco  Ophthalmic Medications: none  Allergies: NKDA    Review of Systems:  Constitutional: No fever, chills  Eyes: No blurry vision, flashes, floaters, FBS, erythema, discharge, double vision, OU  Neuro: No tremors  Cardiovascular: No chest pain, palpitations  Respiratory: No SOB, no cough  GI: No nausea, vomiting, abdominal pain  : No dysuria  Skin: no rash  Psych: no depression  Endocrine: no polyuria, polydipsia  Heme/lymph: no swelling    VITALS: T(C): 36.7 (02-15-25 @ 16:50)  T(F): 98 (02-15-25 @ 16:50), Max: 98 (02-15-25 @ 16:50)  HR: 80 (02-15-25 @ 16:50) (80 - 88)  BP: 133/87 (02-15-25 @ 16:50) (133/87 - 160/68)  RR:  (17 - 20)  SpO2:  (98% - 100%)  Wt(kg): --  General: AAO x 3, appropriate mood and affect    Ophthalmology Exam:  Visual acuity (sc): 20/20 OD, 20/20 OS  Pupils: PERRL OU, no RAPD  Ttono: 16 OD 16 OS  Extraocular movements (EOMs): Full OU, no pain, no diplopia  Confrontational Visual Field (CVF): Full OD, Full OS  Color Plates: 12/12 OD, 12/12 OS    Pen Light Exam (PLE)  External: Flat OU  Lids/Lashes/Lacrimal Ducts: Flat OU    Sclera/Conjunctiva: W+Q OU  Cornea: Cl OU  Anterior Chamber: D+F OU    Iris: Flat OU  Lens: Cl OU    Fundus Exam: dilated with 1% tropicamide and 2.5% phenylephrine  Approval obtained from primary team for dilation  Patient aware that pupils can remained dilated for at least 4-6 hours  Exam performed with 20D lens    Vitreous: wnl OU  Disc, cup/disc: sharp and pink, 0.4 OU  Macula: Flat OU  Vessels: Normal caliber OU  Periphery: flat OU    Labs/Imaging:  *** [facility] DEPARTMENT OF OPHTHALMOLOGY - INITIAL ADULT CONSULT  -----------------------------------------------------------------------------  Diony King MD, PGY-2  Contact: TEAMS  -----------------------------------------------------------------------------    HPI:    Interval History: pt fell, glasses shattered. LP vision at baseline OD iso glaucoma. OS previously patient's only functional eye. 1996 OS PCIOL 2014 PCIOL OD. "glaucoma implant" OD     PMH: ***  POcHx: denies surg/laser  FH: denies glc/amd  Social History: denies etoh/tobacco  Ophthalmic Medications: none  Allergies: NKDA    Review of Systems:  Constitutional: No fever, chills  Eyes: No blurry vision, flashes, floaters, FBS, erythema, discharge, double vision, OU  Neuro: No tremors  Cardiovascular: No chest pain, palpitations  Respiratory: No SOB, no cough  GI: No nausea, vomiting, abdominal pain  : No dysuria  Skin: no rash  Psych: no depression  Endocrine: no polyuria, polydipsia  Heme/lymph: no swelling    VITALS: T(C): 36.7 (02-15-25 @ 16:50)  T(F): 98 (02-15-25 @ 16:50), Max: 98 (02-15-25 @ 16:50)  HR: 80 (02-15-25 @ 16:50) (80 - 88)  BP: 133/87 (02-15-25 @ 16:50) (133/87 - 160/68)  RR:  (17 - 20)  SpO2:  (98% - 100%)  Wt(kg): --  General: AAO x 3, appropriate mood and affect    Ophthalmology Exam:  Visual acuity (sc): LP OD LP OS  Pupils JESUS irregular   Ttono: 6 OD 6 OS   Extraocular movements (EOMs): OS -1 supraduction   Confrontational Visual Field (CVF): JESUS OU  Color Plates: JESUS OU     Pen Light Exam (PLE)  External: Flat OU  Lids/Lashes/Lacrimal Ducts: mild lid swelling OU    Sclera/Conjunctiva: superior bleb OD bullous EDWIN 360 with inferotemporal sparing near limbus. Superonasal hematoma OS  Cornea: severe haze with conjunctivalization OD    Anterior Chamber: clotted nasal heme OS  Iris: irregular OD superiorly displaced OS   Lens: PCIOL OD 1 piece lens in AC OU    B scan OU:      [facility] DEPARTMENT OF OPHTHALMOLOGY - INITIAL ADULT CONSULT  -----------------------------------------------------------------------------  Diony King MD, PGY-2  Contact: TEAMS  -----------------------------------------------------------------------------    HPI: 69F Marfan syndrome, glaucoma, Rt lens dislocation - chronically blind, p/w mech fall w/ loss of vision to Lt eye at [facility], transferred to [facility] ER for opthal eval    Interval History: pt fell, glasses shattered. LP vision at baseline OD iso glaucoma. OS previously patient's only functional eye. 1996 OS PCIOL 2014 PCIOL OD. "glaucoma implant" OD     PMH: Marfan's syndrome   POcHx: as above   FH: denies glc/amd  Social History: denies etoh/tobacco  Ophthalmic Medications: none  Allergies: NKDA    Review of Systems:  Constitutional: No fever, chills  Eyes: + blurry vision OS   Neuro: No tremors  Cardiovascular: No chest pain, palpitations  Respiratory: No SOB, no cough  GI: No nausea, vomiting, abdominal pain  : No dysuria  Skin: no rash  Psych: no depression  Endocrine: no polyuria, polydipsia  Heme/lymph: no swelling    VITALS: T(C): 36.7 (02-15-25 @ 16:50)  T(F): 98 (02-15-25 @ 16:50), Max: 98 (02-15-25 @ 16:50)  HR: 80 (02-15-25 @ 16:50) (80 - 88)  BP: 133/87 (02-15-25 @ 16:50) (133/87 - 160/68)  RR:  (17 - 20)  SpO2:  (98% - 100%)  Wt(kg): --  General: AAO x 3, appropriate mood and affect    Ophthalmology Exam:  Visual acuity (sc): LP OD LP OS  Pupils JESUS irregular   Ttono: 6 OD 6 OS   Extraocular movements (EOMs): OS -1 supraduction   Confrontational Visual Field (CVF): JESUS OU  Color Plates: JESUS OU     Pen Light Exam (PLE)  External: Flat OU  Lids/Lashes/Lacrimal Ducts: mild lid swelling OU    Sclera/Conjunctiva: superior bleb with scleromalacia OD bullous EDWIN 360 with inferotemporal sparing near limbus. Superonasal hematoma OS  Cornea: severe haze with conjunctivalization OD    Anterior Chamber: JESUS iso haze OD  clotted nasal heme OS  Iris: irregular OD superiorly displaced OS   Lens: PCIOL OD 1 piece lens in AC OU    Unable to DFE iso heme      [facility] DEPARTMENT OF OPHTHALMOLOGY - INITIAL ADULT CONSULT  -----------------------------------------------------------------------------  Diony King MD, PGY-2  Contact: TEAMS  -----------------------------------------------------------------------------  PLEASE SEE MORE RECENT CHART NOTE WITH UPDATED RECOMMENDATIONS       HPI: 69F Marfan syndrome, glaucoma, Rt lens dislocation - chronically blind, p/w mech fall w/ loss of vision to Lt eye at [facility], transferred to [facility] ER for opthal eval    Interval History: pt fell, glasses shattered. LP vision at baseline OD iso glaucoma. OS previously patient's only functional eye. 1996 OS PCIOL 2014 PCIOL OD. "glaucoma implant" OD     PMH: Marfan's syndrome   POcHx: as above   FH: denies glc/amd  Social History: denies etoh/tobacco  Ophthalmic Medications: none  Allergies: NKDA    Review of Systems:  Constitutional: No fever, chills  Eyes: + blurry vision OS   Neuro: No tremors  Cardiovascular: No chest pain, palpitations  Respiratory: No SOB, no cough  GI: No nausea, vomiting, abdominal pain  : No dysuria  Skin: no rash  Psych: no depression  Endocrine: no polyuria, polydipsia  Heme/lymph: no swelling    VITALS: T(C): 36.7 (02-15-25 @ 16:50)  T(F): 98 (02-15-25 @ 16:50), Max: 98 (02-15-25 @ 16:50)  HR: 80 (02-15-25 @ 16:50) (80 - 88)  BP: 133/87 (02-15-25 @ 16:50) (133/87 - 160/68)  RR:  (17 - 20)  SpO2:  (98% - 100%)  Wt(kg): --  General: AAO x 3, appropriate mood and affect    Ophthalmology Exam:  Visual acuity (sc): LP OD LP OS  Pupils JESUS irregular   Ttono: 6 OD 6 OS   Extraocular movements (EOMs): OS -1 supraduction   Confrontational Visual Field (CVF): JESUS OU  Color Plates: JESUS OU     Pen Light Exam (PLE)  External: Flat OU  Lids/Lashes/Lacrimal Ducts: mild lid swelling OU    Sclera/Conjunctiva: superior bleb with scleromalacia OD bullous EDWIN 360 with inferotemporal sparing near limbus. Superonasal hematoma OS  Cornea: severe haze with conjunctivalization OD    Anterior Chamber: JESUS iso haze OD  clotted nasal heme OS  Iris: irregular OD superiorly displaced OS   Lens: PCIOL OD 1 piece lens in AC OU    Unable to DFE iso heme

## 2025-02-15 NOTE — H&P ADULT - PROBLEM SELECTOR PLAN 1
No entrapment, double vision, or pain w/ OMS.  - appreciate optho and OMFS recs  - sinus precautions  - start augmentin, continue upon discharge  - afrin upon discharge No entrapment, double vision, or pain w/ OMS.  - appreciate optho  - follow up final OMFS recs prior to discharge in AM  - sinus precautions  - start augmentin, continue upon discharge  - afrin upon discharge

## 2025-02-15 NOTE — ED PROVIDER NOTE - CLINICAL SUMMARY MEDICAL DECISION MAKING FREE TEXT BOX
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  Patient fell and broke her glasses and now have laceration under left eye on the face and also a laceration of the left eye.  Concern was for globe rupture therefore was transferred here for ophthalmology evaluation.  On presentation patient with left eye laceration to cornea possible hyphema.  Patient states there is no pain at this time.  Patient received Ancef at the outside hospital however did not get the tetanus shot.  Patient cannot see much out of the left eye except for looking at the light.  Vital signs here within normal limits.  Physical exam shows patient with left eye corneal laceration with hyphema.  Able to sense light however not able to visualize.  Able to extraocular movement without pain.  There is a U-shaped laceration of the left cheek that is length of 5 cm or so.  Concern at this time for globe rupture.  Ophtho will discuss case with the team.  Patient also has left infraorbital fracture which ophtho states that can take care of it.  Will give vancomycin per the recommendation and give tetanus update here.  Will keep her n.p.o.

## 2025-02-15 NOTE — ED ADULT NURSE NOTE - NSFALLRISKINTERV_ED_ALL_ED

## 2025-02-15 NOTE — ED ADULT NURSE NOTE - CHIEF COMPLAINT QUOTE
xfer from [place] for optho- dx with L ruptured globe s/p mechanical fall today- hx bind in R eye, marfans, HTN

## 2025-02-15 NOTE — H&P ADULT - NSHPLABSRESULTS_GEN_ALL_CORE
12.8   7.97  )-----------( 216      ( 15 Feb 2025 15:00 )             40.1     139  |  102  |  16.1  ----------------------------<  104[H]     02-15  3.7   |  24.0  |  0.64    Ca    9.3      15 Feb 2025 15:00    TPro  6.5[L]  /  Alb  4.0  /  TBili  0.3[L]  /  DBili  x   /  AST  26  /  ALT  18  /  AlkPhos  77  02-15    PT/INR: 11.6/1.00 (02-15-25 @ 15:00)  PTT: -- (02-15-25 @ 15:00)              hs Troponin, T - 8 ng/L (02-15-25 @ 15:00) 12.8   7.97  )-----------( 216      ( 15 Feb 2025 15:00 )             40.1     139  |  102  |  16.1  ----------------------------<  104[H]     02-15  3.7   |  24.0  |  0.64    Ca    9.3      15 Feb 2025 15:00    TPro  6.5[L]  /  Alb  4.0  /  TBili  0.3[L]  /  DBili  x   /  AST  26  /  ALT  18  /  AlkPhos  77  02-15    PT/INR: 11.6/1.00 (02-15-25 @ 15:00)  PTT: -- (02-15-25 @ 15:00)      hs Troponin, T - 8 ng/L (02-15-25 @ 15:00)    CTH/facial bones:  IMPRESSION:  CT head:  -No acute intracranial findings.    CT facial bones:  -Left orbital floor blowout fracture. No rectus muscle herniation.   Hemorrhage in the left maxillary sinus.    CT Cspine:  IMPRESSION:  No acute cervical spine fracture.

## 2025-02-15 NOTE — CONSULT NOTE ADULT - ASSESSMENT
Assessment and Recommendations:  69y female with a past medical history/ocular history of *** consulted for ***, found to have ***    #Globe Laceration OS  - Fell on floor and glasses broke into eye  - Previously 20/25 OS per patient   - LP OU, pupils irregular OU  - IOP not tested given status of eye  - B scan with possible scleral laceration OS at supertemporal with overlying hematoma and superiorly displaced pupil  - Hyphema with 40% of globe OS  - START Vancomycin, ceftazadine (IV ancef given at [facility])  - Hard shield at all times  - Tetanus shot if not already given       #Orbital Blowout fracture OS  - On CT scan, blood in maxilla OS  - No entrapment on CT or clinical evaluation  - no enophthalmos, no double vision, no significant pain with EOMS  - Sinus precautions, afrin, augmentin on discharge, OMFS   - -1 upgaze on EOMS likely in setting of hemotoma unrelated to fracture  - Unrelated facial laceration     #Glaucoma OD  #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 Pitt, Chief Resident    Outpatient Follow-up: Patient should follow-up with his/her ophthalmologist or with [facility] Department of Ophthalmology within 1 week of after discharge at:    [address]  [place] [address]  [phone]    Diony King MD, PGY-2  Contact: [employer] Teams     Assessment and Recommendations:  69y female with a past medical history/ocular history of glaucoma OD, PCIOL OU, marfan's syndrome consulted for traumatic eye injury found to have ***    #Globe Laceration OS  - Fell on floor and glasses broke into eye  - Previously 20/25 OS per patient   - LP OU, pupils irregular OU  - IOP not tested given status of eye  - B scan with possible scleral laceration OS at supertemporal with overlying hematoma and superiorly displaced pupil  - Hyphema with 40% of globe OS  - START Vancomycin, ceftazadine (IV ancef given at 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 Pitt, Chief Resident    Outpatient Follow-up: Patient should follow-up with his/her ophthalmologist or with [facility] Department of Ophthalmology within 1 week of after discharge at:    [address]  [place] [address]  [phone]    Diony King MD, PGY-2  Contact: [employer] Teams     Assessment and Recommendations:  69y female with a past medical history/ocular history of glaucoma OD, PCIOL OU, marfan's syndrome consulted for traumatic eye injury found to have possible globe rupture requiring exploration     #Globe Laceration OS  - Fell on floor and glasses broke into eye  - Previously 20/25 OS per patient   - LP OU, pupils irregular OU  - IOP not tested given status of eye  - B scan with possible scleral laceration OS at supertemporal with overlying hematoma and superiorly displaced pupil  - Hyphema with 40% of globe OS  - START Vancomycin, ceftazadine (IV ancef given at [facility])  - Hard shield at all times  - Tetanus shot if not already given   - NPO at midnight for OR 2/16/25      #Orbital Blowout fracture OS  - On CT scan, blood in maxilla OS  - No entrapment on CT or clinical evaluation  - no enophthalmos, no double vision, no significant pain with EOMS  - Sinus precautions, afrin, augmentin on discharge, OMFS   - -1 upgaze on EOMS likely in setting of hematoma unrelated to fracture  - Unrelated facial laceration, repair per ED     #Glaucoma OD  #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 Pitt, Chief Resident    Outpatient Follow-up: Patient should follow-up with his/her ophthalmologist or with [facility] Department of Ophthalmology within 1 week of after discharge at:    [address]  [place] [address]  [phone]    Diony King MD, PGY-2  Contact: [employer] Teams     Assessment and Recommendations:  69y female with a past medical history/ocular history of glaucoma OD, PCIOL OU, marfan's syndrome consulted for traumatic eye injury found to have possible globe rupture requiring exploration     #Globe Laceration OS  - Fell on floor and glasses broke into eye  - Previously 20/25 OS per patient   - LP OU, pupils irregular OU  - IOP not tested given status of eye  - B scan with possible scleral laceration OS at supertemporal with overlying hematoma and superiorly displaced pupil  - Hyphema with 40% of globe OS  - START Vancomycin, ceftazadine (IV ancef given at [facility])  - Hard shield at all times  - Tetanus shot if not already given   - NPO at midnight for OR 2/16/25  - preop labs  - please document medical clearance for surgery       #Orbital Blowout fracture OS  - On CT scan, blood in maxilla OS  - No entrapment on CT or clinical evaluation  - no enophthalmos, no double vision, no significant pain with EOMS  - Sinus precautions, afrin, augmentin on discharge, OMFS   - -1 upgaze on EOMS likely in setting of hematoma unrelated to fracture  - Unrelated facial laceration, repair per ED     #Glaucoma OD  #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 Pitt, Chief Resident    Outpatient Follow-up: Patient should follow-up with his/her ophthalmologist or with [facility] Department of Ophthalmology within 1 week of after discharge at:    [address]  [place] [address]  [phone]    Diony King MD, PGY-2  Contact: [employer] Teams     Assessment and Recommendations:  69y female with a past medical history/ocular history of glaucoma OD, PCIOL OU, marfan's syndrome consulted for traumatic eye injury found to have possible globe rupture requiring exploration     #Globe Laceration OS  - Fell on floor and glasses broke into eye  - Previously 20/25 OS per patient   - LP OU, pupils irregular OU  - IOP not tested given status of eye  - B scan with possible scleral laceration OS at supertemporal with overlying hematoma and superiorly displaced pupil  - Hyphema with 40% of globe OS  - START Vancomycin, ceftazadine (IV ancef given at [facility])  - Hard shield at all times  - Tetanus shot if not already given           #Orbital Blowout fracture OS  - On CT scan, blood in maxilla OS  - No entrapment on CT or clinical evaluation  - no enophthalmos, no double vision, no significant pain with EOMS  - Sinus precautions, afrin, augmentin on discharge, OMFS   - -1 upgaze on EOMS likely in setting of hematoma unrelated to fracture  - Unrelated facial laceration, repair per ED     #Glaucoma OD  #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 Pitt, Chief Resident    Outpatient Follow-up: Patient should follow-up with his/her ophthalmologist or with [facility] Department of Ophthalmology within 1 week of after discharge at:    [address]  [place] [address]  [phone]    Diony King MD, PGY-2  Contact: [employer] Teams

## 2025-02-15 NOTE — ED PROVIDER NOTE - NSICDXPASTSURGICALHX_GEN_ALL_CORE_FT
PAST SURGICAL HISTORY:  Other postprocedural status H/O varicose vein stripping    Status post total hysterectomy H/O: hysterectomy

## 2025-02-15 NOTE — H&P ADULT - PROBLEM SELECTOR PLAN 2
- appreciate optho recs; to see in AM prior to discharge given patient staying overnight  - hard shield at all times  - start cyclogyl in L eye

## 2025-02-15 NOTE — ED PROVIDER NOTE - ATTENDING CONTRIBUTION TO CARE
69F pmh Marfan syndrome, glaucoma and lens dislocation now presenting with left side eye trauma. Tx from outside hosp s/p fall glasses break with concern of globe rupture and blowout fx.  Pt denies pain at this time but only can see light to eye. Patient fell and broke her glasses and now have laceration under left eye on the face and also a laceration of the left eye. Patient received Ancef at the outside hospital pt states she received tetanus. Also a U-shaped laceration of the left cheek. Concern at this time for globe rupture. Ophtho will discuss case with the team.  scleral laceration OS at supertemporal with overlying hematoma and superiorly displaced pupil    optho, OMFS, lac repair   eye shield npo will give vanc for gram + coverage and f/u official plan

## 2025-02-16 DIAGNOSIS — Z29.9 ENCOUNTER FOR PROPHYLACTIC MEASURES, UNSPECIFIED: ICD-10-CM

## 2025-02-16 DIAGNOSIS — S05.42XA PENETRATING WOUND OF ORBIT WITH OR WITHOUT FOREIGN BODY, LEFT EYE, INITIAL ENCOUNTER: ICD-10-CM

## 2025-02-16 DIAGNOSIS — I10 ESSENTIAL (PRIMARY) HYPERTENSION: ICD-10-CM

## 2025-02-16 DIAGNOSIS — S02.30XA FRACTURE OF ORBITAL FLOOR, UNSPECIFIED SIDE, INITIAL ENCOUNTER FOR CLOSED FRACTURE: ICD-10-CM

## 2025-02-16 DIAGNOSIS — E78.5 HYPERLIPIDEMIA, UNSPECIFIED: ICD-10-CM

## 2025-02-16 LAB
ANION GAP SERPL CALC-SCNC: 16 MMOL/L — HIGH (ref 7–14)
BUN SERPL-MCNC: 12 MG/DL — SIGNIFICANT CHANGE UP (ref 7–23)
CALCIUM SERPL-MCNC: 9.1 MG/DL — SIGNIFICANT CHANGE UP (ref 8.4–10.5)
CHLORIDE SERPL-SCNC: 103 MMOL/L — SIGNIFICANT CHANGE UP (ref 98–107)
CO2 SERPL-SCNC: 20 MMOL/L — LOW (ref 22–31)
CREAT SERPL-MCNC: 0.57 MG/DL — SIGNIFICANT CHANGE UP (ref 0.5–1.3)
EGFR: 98 ML/MIN/1.73M2 — SIGNIFICANT CHANGE UP
GLUCOSE SERPL-MCNC: 100 MG/DL — HIGH (ref 70–99)
HCT VFR BLD CALC: 38.3 % — SIGNIFICANT CHANGE UP (ref 34.5–45)
HGB BLD-MCNC: 12.1 G/DL — SIGNIFICANT CHANGE UP (ref 11.5–15.5)
MCHC RBC-ENTMCNC: 27.2 PG — SIGNIFICANT CHANGE UP (ref 27–34)
MCHC RBC-ENTMCNC: 31.6 G/DL — LOW (ref 32–36)
MCV RBC AUTO: 86.1 FL — SIGNIFICANT CHANGE UP (ref 80–100)
NRBC # BLD AUTO: 0 K/UL — SIGNIFICANT CHANGE UP (ref 0–0)
NRBC # FLD: 0 K/UL — SIGNIFICANT CHANGE UP (ref 0–0)
NRBC BLD AUTO-RTO: 0 /100 WBCS — SIGNIFICANT CHANGE UP (ref 0–0)
PLATELET # BLD AUTO: 217 K/UL — SIGNIFICANT CHANGE UP (ref 150–400)
POTASSIUM SERPL-MCNC: 4.1 MMOL/L — SIGNIFICANT CHANGE UP (ref 3.5–5.3)
POTASSIUM SERPL-SCNC: 4.1 MMOL/L — SIGNIFICANT CHANGE UP (ref 3.5–5.3)
RBC # BLD: 4.45 M/UL — SIGNIFICANT CHANGE UP (ref 3.8–5.2)
RBC # FLD: 13.9 % — SIGNIFICANT CHANGE UP (ref 10.3–14.5)
SODIUM SERPL-SCNC: 139 MMOL/L — SIGNIFICANT CHANGE UP (ref 135–145)
WBC # BLD: 7.65 K/UL — SIGNIFICANT CHANGE UP (ref 3.8–10.5)
WBC # FLD AUTO: 7.65 K/UL — SIGNIFICANT CHANGE UP (ref 3.8–10.5)

## 2025-02-16 PROCEDURE — 99233 SBSQ HOSP IP/OBS HIGH 50: CPT

## 2025-02-16 RX ORDER — LISINOPRIL 30 MG/1
1 TABLET ORAL
Refills: 0 | DISCHARGE

## 2025-02-16 RX ORDER — SODIUM CHLORIDE 50 MG/G
1 OINTMENT OPHTHALMIC
Refills: 0 | Status: DISCONTINUED | OUTPATIENT
Start: 2025-02-16 | End: 2025-02-16

## 2025-02-16 RX ORDER — LOSARTAN POTASSIUM 100 MG/1
25 TABLET, FILM COATED ORAL DAILY
Refills: 0 | Status: DISCONTINUED | OUTPATIENT
Start: 2025-02-16 | End: 2025-02-17

## 2025-02-16 RX ORDER — GABAPENTIN 400 MG/1
0 CAPSULE ORAL
Refills: 0 | DISCHARGE

## 2025-02-16 RX ORDER — MELATONIN 5 MG
6 TABLET ORAL ONCE
Refills: 0 | Status: COMPLETED | OUTPATIENT
Start: 2025-02-16 | End: 2025-02-16

## 2025-02-16 RX ORDER — KETOROLAC TROMETHAMINE 5 MG/ML
1 SOLUTION/ DROPS OPHTHALMIC AT BEDTIME
Refills: 0 | Status: DISCONTINUED | OUTPATIENT
Start: 2025-02-16 | End: 2025-02-17

## 2025-02-16 RX ORDER — LISINOPRIL 30 MG/1
50 TABLET ORAL AT BEDTIME
Refills: 0 | Status: DISCONTINUED | OUTPATIENT
Start: 2025-02-16 | End: 2025-02-17

## 2025-02-16 RX ORDER — LISINOPRIL 30 MG/1
50 TABLET ORAL DAILY
Refills: 0 | Status: DISCONTINUED | OUTPATIENT
Start: 2025-02-16 | End: 2025-02-16

## 2025-02-16 RX ORDER — LOSARTAN POTASSIUM 100 MG/1
1 TABLET, FILM COATED ORAL
Refills: 0 | DISCHARGE

## 2025-02-16 RX ORDER — CHLOROQUINE PHOSPHATE
1 POWDER (GRAM) MISCELLANEOUS THREE TIMES A DAY
Refills: 0 | Status: DISCONTINUED | OUTPATIENT
Start: 2025-02-16 | End: 2025-02-17

## 2025-02-16 RX ORDER — KETOROLAC TROMETHAMINE 5 MG/ML
1 SOLUTION/ DROPS OPHTHALMIC
Refills: 0 | DISCHARGE

## 2025-02-16 RX ORDER — HYPROMELLOSE 0.4 %
0 DROPS OPHTHALMIC (EYE)
Refills: 0 | DISCHARGE

## 2025-02-16 RX ORDER — GABAPENTIN 400 MG/1
100 CAPSULE ORAL AT BEDTIME
Refills: 0 | Status: DISCONTINUED | OUTPATIENT
Start: 2025-02-16 | End: 2025-02-17

## 2025-02-16 RX ORDER — AMOXICILLIN AND CLAVULANATE POTASSIUM 500; 125 MG/1; MG/1
1 TABLET, FILM COATED ORAL
Refills: 0 | Status: DISCONTINUED | OUTPATIENT
Start: 2025-02-16 | End: 2025-02-17

## 2025-02-16 RX ORDER — ATORVASTATIN CALCIUM 80 MG/1
10 TABLET, FILM COATED ORAL AT BEDTIME
Refills: 0 | Status: DISCONTINUED | OUTPATIENT
Start: 2025-02-16 | End: 2025-02-17

## 2025-02-16 RX ORDER — SODIUM CHLORIDE 50 MG/G
1 OINTMENT OPHTHALMIC
Refills: 0 | DISCHARGE

## 2025-02-16 RX ADMIN — AMOXICILLIN AND CLAVULANATE POTASSIUM 1 TABLET(S): 500; 125 TABLET, FILM COATED ORAL at 17:12

## 2025-02-16 RX ADMIN — LOSARTAN POTASSIUM 25 MILLIGRAM(S): 100 TABLET, FILM COATED ORAL at 06:50

## 2025-02-16 RX ADMIN — LISINOPRIL 50 MILLIGRAM(S): 30 TABLET ORAL at 22:43

## 2025-02-16 RX ADMIN — Medication 1 DROP(S): at 22:42

## 2025-02-16 RX ADMIN — Medication 1 DROP(S): at 05:14

## 2025-02-16 RX ADMIN — ATORVASTATIN CALCIUM 10 MILLIGRAM(S): 80 TABLET, FILM COATED ORAL at 22:43

## 2025-02-16 RX ADMIN — AMOXICILLIN AND CLAVULANATE POTASSIUM 1 TABLET(S): 500; 125 TABLET, FILM COATED ORAL at 05:13

## 2025-02-16 RX ADMIN — Medication 6 MILLIGRAM(S): at 01:57

## 2025-02-16 RX ADMIN — GABAPENTIN 100 MILLIGRAM(S): 400 CAPSULE ORAL at 22:42

## 2025-02-16 RX ADMIN — Medication 1 DROP(S): at 17:16

## 2025-02-16 NOTE — PROGRESS NOTE ADULT - SUBJECTIVE AND OBJECTIVE BOX
PROGRESS NOTE:     Patient is a 69y old  Female who presents with a chief complaint of Transfer for ophtho eval, L globe lac and infraorbital fx (16 Feb 2025 09:24)      SUBJECTIVE / OVERNIGHT EVENTS: NAEON. Denies pain, reports decreased vision     ADDITIONAL REVIEW OF SYSTEMS:    MEDICATIONS  (STANDING):  amoxicillin  875 milliGRAM(s)/clavulanate 1 Tablet(s) Oral two times a day  atenolol  Tablet 50 milliGRAM(s) Oral at bedtime  atorvastatin 10 milliGRAM(s) Oral at bedtime  cyclopentolate 1% Solution 1 Drop(s) Left EYE three times a day  gabapentin 100 milliGRAM(s) Oral at bedtime  ketorolac 0.5% Ophthalmic Solution 1 Drop(s) Right EYE at bedtime  losartan 25 milliGRAM(s) Oral daily    MEDICATIONS  (PRN):  acetaminophen     Tablet .. 650 milliGRAM(s) Oral every 6 hours PRN Temp greater or equal to 38C (100.4F), Mild Pain (1 - 3)      CAPILLARY BLOOD GLUCOSE        I&O's Summary      PHYSICAL EXAM:  Vital Signs Last 24 Hrs  T(C): 36.7 (16 Feb 2025 10:55), Max: 36.9 (15 Feb 2025 19:57)  T(F): 98 (16 Feb 2025 10:55), Max: 98.5 (15 Feb 2025 19:57)  HR: 80 (16 Feb 2025 10:55) (66 - 88)  BP: 124/58 (16 Feb 2025 10:55) (114/61 - 160/68)  BP(mean): 75 (16 Feb 2025 06:54) (75 - 84)  RR: 18 (16 Feb 2025 10:55) (16 - 20)  SpO2: 99% (16 Feb 2025 10:55) (95% - 100%)    Parameters below as of 16 Feb 2025 10:55  Patient On (Oxygen Delivery Method): room air        CONSTITUTIONAL: NAD  HEENT : +patch over left eye, left side periorbital edema and ecchymosis, subconjunctival hemorrhage   RESPIRATORY: Normal respiratory effort; lungs are clear to auscultation bilaterally  CARDIOVASCULAR: Regular rate and rhythm, normal S1 and S2, no murmur/rub/gallop; No lower extremity edema; Peripheral pulses are 2+ bilaterally  ABDOMEN: Nontender to palpation, normoactive bowel sounds, no rebound/guarding; No hepatosplenomegaly  MUSCLOSKELETAL: no clubbing or cyanosis of digits; no joint swelling or tenderness to palpation  NEURO: AOx3    LABS:                        12.1   7.65  )-----------( 217      ( 16 Feb 2025 06:50 )             38.3     02-16    139  |  103  |  12  ----------------------------<  100[H]  4.1   |  20[L]  |  0.57    Ca    9.1      16 Feb 2025 05:45    TPro  6.5[L]  /  Alb  4.0  /  TBili  0.3[L]  /  DBili  x   /  AST  26  /  ALT  18  /  AlkPhos  77  02-15    PT/INR - ( 15 Feb 2025 15:00 )   PT: 11.6 sec;   INR: 1.00 ratio               Urinalysis Basic - ( 16 Feb 2025 05:45 )    Color: x / Appearance: x / SG: x / pH: x  Gluc: 100 mg/dL / Ketone: x  / Bili: x / Urobili: x   Blood: x / Protein: x / Nitrite: x   Leuk Esterase: x / RBC: x / WBC x   Sq Epi: x / Non Sq Epi: x / Bacteria: x          RADIOLOGY & ADDITIONAL TESTS:  Results Reviewed:   Imaging Personally Reviewed:  Electrocardiogram Personally Reviewed:    COORDINATION OF CARE:  Care Discussed with Consultants/Other Providers [Y/N]:  Prior or Outpatient Records Reviewed [Y/N]:

## 2025-02-16 NOTE — ED ADULT NURSE REASSESSMENT NOTE - NS ED NURSE REASSESS COMMENT FT1
Pt. resting in bed, respirations even and unlabored, chest rise equal bilaterally. Pt. noted to have swelling and ecchymosis to left periorbital space. Right 20G IV placed to the wrist. Medication administered as prescribed. Comfort measures provided, safety maintained throughout.

## 2025-02-16 NOTE — PROGRESS NOTE ADULT - ASSESSMENT
69F Marfan syndrome, glaucoma, HTN, HLD, Rt lens dislocation - chronically blind who intially presented s/p mech fall w/ loss of vision to L eye to [facility], there found to have L orbital floor blowout fracture with c/f possible ruptured globe, transferred to [facility] ER for ophtho eval. OMFS and opthalmology following.

## 2025-02-16 NOTE — ED ADULT NURSE REASSESSMENT NOTE - NS ED NURSE REASSESS COMMENT FT1
Pt. resting in bed, respirations even and unlabored, chest rise equal bilaterally. Pt. endorses mild pain to left side of the face at this time. Patch placed by opthalmology intact. Medication administered as prescribed. Comfort measures provided, safety maintained throughout.

## 2025-02-16 NOTE — PROGRESS NOTE ADULT - SUBJECTIVE AND OBJECTIVE BOX
[facility] DEPARTMENT OF OPHTHALMOLOGY  ------------------------------------------------------------------------------  Dianna Gomez MD, PGY-2  Contact: TEAMS  ------------------------------------------------------------------------------    Interval History: persistently decreased vision.     MEDICATIONS  (STANDING):  amoxicillin  875 milliGRAM(s)/clavulanate 1 Tablet(s) Oral two times a day  atenolol  Tablet 50 milliGRAM(s) Oral at bedtime  atorvastatin 10 milliGRAM(s) Oral at bedtime  cyclopentolate 1% Solution 1 Drop(s) Left EYE three times a day  gabapentin 100 milliGRAM(s) Oral at bedtime  ketorolac 0.5% Ophthalmic Solution 1 Drop(s) Right EYE at bedtime  losartan 25 milliGRAM(s) Oral daily    MEDICATIONS  (PRN):  acetaminophen     Tablet .. 650 milliGRAM(s) Oral every 6 hours PRN Temp greater or equal to 38C (100.4F), Mild Pain (1 - 3)      VITALS: T(C): 36.7 (02-16-25 @ 10:55)  T(F): 98 (02-16-25 @ 10:55), Max: 98.5 (02-15-25 @ 19:57)  HR: 80 (02-16-25 @ 10:55) (66 - 80)  BP: 124/58 (02-16-25 @ 10:55) (114/61 - 140/79)  RR:  (16 - 19)  SpO2:  (95% - 100%)  Wt(kg): --  General: AAO x 3, appropriate mood and affect    Ophthalmology Exam:  Visual acuity (sc): LP OD LP OS  Pupils JESUS irregular   Ttono: 6 OD 4 OS   Extraocular movements (EOMs): OS -1 supraduction   Confrontational Visual Field (CVF): JESUS OU  Color Plates: JESUS OU     Pen Light Exam (PLE)  External: Flat OU  Lids/Lashes/Lacrimal Ducts: mild lid swelling OU    Sclera/Conjunctiva: superior bleb with scleromalacia OD bullous EWDIN 360 with inferotemporal sparing near limbus. Superonasal hematoma OS  Cornea: severe haze with conjunctivalization OD    Anterior Chamber: JESUS iso haze OD  clotted nasal heme OS  Iris: irregular OD superiorly displaced OS   Lens: PCIOL OD 1 piece lens in AC OU    Unable to DFE iso heme OS

## 2025-02-16 NOTE — PROGRESS NOTE ADULT - PROBLEM SELECTOR PLAN 1
No entrapment, double vision, or pain w/ OMS.  - appreciate optho  - follow up final OMFS recs prior to discharge in AM  - sinus precautions  - start augmentin, continue upon discharge  - afrin upon discharge

## 2025-02-16 NOTE — ED ADULT NURSE REASSESSMENT NOTE - NS ED NURSE REASSESS COMMENT FT1
Pt received from primary RN stable. Respirations even and unlabored. Pt states she is anxious and was hungry. Pt provided with food and pt currently resting. TBA, awaiting order and bed assignment. Safety precautions in place.

## 2025-02-16 NOTE — PATIENT PROFILE ADULT - FUNCTIONAL ASSESSMENT - BASIC MOBILITY 1.
[facility]      Chief complaint: urinary frequency    HPI:  Frankeya Edwards is a 37 y.o. female patient with PMHx of HTN, obesity, bipolar disorder, schizophrenia, presenting for urinary symptoms.    Patient reports urinary frequency (needs to go to the bathroom multiple times per day and up to 6 times per night) that started 2 weeks ago. No associated urinary burning. Presence of urgency but no incontinence.   Reports drinking more water recently, but not more than 8 glasses per day.  No fever or chills. No abdominal or pelvic pain. No flank pain.    No vaginal discharge.  Reports some bleeding during intercourse. No bleed outside of intercourse.  Patient is sexually active with 4 male partners. No protection used. Underwent tubal ligation in 2016.  Reports her period has always been irregular.    During this visit her BP is 170 systolic. Reports that she did not take her anti hypertensive medications today and yesterday. She is not adherent to low salt diet.    Review of systems:  Review of Systems   Constitutional:  Negative for chills and fever.   Respiratory:  Negative for shortness of breath.    Cardiovascular:  Negative for chest pain.   Gastrointestinal:  Negative for abdominal pain, diarrhea and vomiting.   Endocrine: Negative for polydipsia.   Genitourinary:  Positive for frequency and urgency. Negative for dysuria, pelvic pain, vaginal discharge and vaginal pain.        Past medical history:  Past Medical History:   Diagnosis Date    Bipolar 1 disorder (Multi)     Candidiasis, unspecified     Yeast infection    Chlamydial infection, unspecified     Chlamydia    Depression     Encounter for screening for malignant neoplasm of vagina     Vaginal Pap smear    Hypertension     Other conditions influencing health status     H/O pregnancy    Other specified health status     Last menstrual period (LMP) > 10 days ago    Personal history of other complications of pregnancy, childbirth  and the puerperium     History of ectopic pregnancy    Personal history of other diseases of the circulatory system     History of hypertension    Personal history of other endocrine, nutritional and metabolic disease     History of obesity    Personal history of other mental and behavioral disorders     History of bipolar disorder    Personal history of other mental and behavioral disorders     Personal history of schizophrenia    Personal history of other specified conditions     History of abnormal Pap smear    Schizophrenia     Type B blood, Rh positive     Blood type B+       Surgical history:  Past Surgical History:   Procedure Laterality Date     SECTION, CLASSIC  2014     Section    COLPOSCOPY  2014    Colposcopy    TUBAL LIGATION  2016    Tubal Ligation       Family history:  Family History   Problem Relation Name Age of Onset    Hypertension Mother         Medications:  Current Outpatient Medications   Medication Instructions    amLODIPine (NORVASC) 10 mg, oral, Daily    ARIPiprazole (Abilify) 15 mg tablet 1 tablet, oral, Daily before breakfast    ARIPiprazole (Abilify) 2 mg tablet TAKE ONE TABLET ONCE DAY    ARIPiprazole (ABILIFY) 5 mg, oral, Daily    divalproex (DEPAKOTE) 500 mg, oral, 2 times daily    docusate sodium (Colace) 100 mg capsule oral, 2 times daily PRN    ibuprofen 600 mg, oral, Every 8 hours PRN    losartan (COZAAR) 50 mg, oral, Daily    miscellaneous medical supply (Blood Pressure Cuff) misc 1 Units, miscellaneous, Daily    multivitamin with minerals (multivitamin-iron-folic acid) tablet 1 tablet, oral, Daily with breakfast    oxyCODONE-acetaminophen (Percocet) 5-325 mg tablet oral, Every 6 hours PRN    PARoxetine (PAXIL) 30 mg, oral, Daily    prenatal 25-iron-folate 6-dha 30 mg iron-1mg -200 mg capsule Vitamed Prenatal Formula ; 1 tab(s) once a day Quantity: 30 Refills: 6 Ordered: 4-Oct-2013 [facility] Start: 4-Oct-2013 Generic Substitution Allowed     prenatal vitamin (Classic Prenatal) 28 mg iron-800 mcg folic acid tablet tablet 1 tablet, oral, Daily    thiamine (VITAMIN B-1) 100 mg, Enteral, 3 times daily    witch hazel-glycerin (Hemorrhoidal Medicated) pad Topical, As needed       Allergies:  Allergies   Allergen Reactions    Other Other       Health maintenance:  Health Maintenance   Topic Date Due    Medicare Annual Wellness Visit (AWV)  Never done    MMR Vaccines (1 of 1 - Standard series) Never done    Pneumococcal Vaccine: Pediatrics (0 to 5 Years) and At-Risk Patients (6 to 64 Years) (1 of 2 - PCV) Never done    Varicella Vaccines (1 of 2 - 13+ 2-dose series) Never done    Hepatitis B Vaccines (1 of 3 - 19+ 3-dose series) Never done    Hepatitis A Vaccines (1 of 2 - Risk 2-dose series) Never done    Influenza Vaccine (1) 09/01/2024    COVID-19 Vaccine (1 - 2024-25 season) Never done    DTaP/Tdap/Td Vaccines (3 - Td or Tdap) 12/12/2024    Cervical Cancer Screening  09/21/2026    Diabetes Screening  01/04/2027    Lipid Panel  01/04/2029    Zoster Vaccines (1 of 2) 11/06/2036    HIV Screening  Completed    Hepatitis C Screening  Completed    HIB Vaccines  Aged Out    IPV Vaccines  Aged Out    Meningococcal Vaccine  Aged Out    Rotavirus Vaccines  Aged Out    HPV Vaccines  Aged Out    Chlamydia Screening  Discontinued     Diet: regular diet, not adherent to low salt or low calories diet  Exercise: walks every day for 15 min    Social history:   reports that she has been smoking cigars. She started smoking about 21 years ago. She has never used smokeless tobacco. She reports that she does not currently use alcohol. She reports that she does not use drugs.  Work: currently does not work  Tobacco: current smoker  Alcohol: chronic alcohol use  Other drugs: none    Sexual History  Sexual activity: Yes  Partners: Male  LMP: period expected today  Contraception: Yes, describe: s/p tubal ligation in 2016  STI Concern/Hx: Concerned at this time- Testing ordered   "        Vitals:  Vitals:    12/11/24 1310   BP: (!) 175/137   Pulse: 77   Temp: 36.3 °C (97.4 °F)   SpO2: 93%       Physical exam:  Physical Exam  Cardiovascular:      Rate and Rhythm: Normal rate and regular rhythm.   Pulmonary:      Effort: Pulmonary effort is normal.      Breath sounds: Normal breath sounds.   Abdominal:      Palpations: Abdomen is soft.      Tenderness: There is no right CVA tenderness or left CVA tenderness.   Neurological:      Mental Status: She is alert and oriented to person, place, and time.         Labs:  Lab Results   Component Value Date    WBC 2.8 (L) 01/04/2024    HGB 13.7 01/04/2024    HCT 38.5 01/04/2024    MCV 93 01/04/2024     01/04/2024       Lab Results   Component Value Date    GLUCOSE 95 01/04/2024    CALCIUM 9.3 01/04/2024     (L) 01/04/2024    K 4.1 01/04/2024    CO2 27 01/04/2024    CL 99 01/04/2024    BUN 7 01/04/2024    CREATININE 0.73 01/04/2024       Lab Results   Component Value Date    HGBA1C 4.6 01/04/2024        Lab Results   Component Value Date    CHOL 245 (H) 01/04/2024     Lab Results   Component Value Date    HDL 74.8 01/04/2024     Lab Results   Component Value Date    LDLCALC 143 (H) 01/04/2024     Lab Results   Component Value Date    TRIG 136 01/04/2024     No components found for: \"CHOLHDL\"    Imaging:  No results found.    Assessment and plan:  Frankeya Edwards is a 37 y.o. female patient with PMHx of schizophrenia, bipolar disorder, HTN, obesity presenting with urinary frequency that started 2 weeks ago.    #Urinary frequency  -c/f UTI, POCT UA ordered: nitrites negative, leucocyte esterase negative  -UA with culture sent to lab    #c/f STDs  -Not vaccinated for hep B. Viral serologies ordered (HIV, hep B, hep C, syphilis screen). -Bleed during sexual activity, c/f HPV infection. Last pap smear in 2023: cytology suggestive of bacterial vaginosis. Currently not due for pap smear.  -Testing for chlamydia, gonorrhea, trichomonas, bacterial " vaginosis ordered (on urine and vaginal swab)    #HTN  -amlodipine increased to 10 mg daily on jan 2024  -currently on amlodipine 10 and losartan 50. Patient has not been taking her medications  -educated about the importance of BP controlled, medications refilled  -Nurse visit in 1 month to check her BP, patient should take her meds before presenting to the visit    #Obesity  #Hyperlipidemia  -last lipid profile 2024: , HDL 74,   -not eligible for statin  -currently not adherent to diet and exercise modifications    #Polysubstance use  -Current smoker  -Chronic alcohol use  -currently not interested in rehab, says that she might go if she finds a spot in few months        HEALTH MAINTENANCE     Vaccines:  Influenza: refused  COVID: never done  Tdap: due  Zoster vaccine (adult >51 yo, 2 doses 2-6mo apart): N/A   Pneumococcal vaccine (adult <65 w/ risk factors eg, smoking, diabetes, heart/liver/lung disease; or adult >65: PCV 21, PCV 20, or PCV15 followed by PPSV23): N/A      Cancer screening:  Mammogram (ACOG rec age 40, USPSTF age 50: shared decision making 40-50, then q1-2 yrs until age 74, and then shared decision making): N/A  Pap Smear (21-29 every 3 yrs; 30-65  every 5 yrs w/HPV or every 3 years if no HPV): last PAP 9/2023   Colonoscopy (age 45-74 yo):  N/A  Low dose chest CT (age 50-80, 20 yrs smoking hx, current or quit within last 15yrs):  N/A    Other screening tests:  AAA screening: N/A  DEXA scan (females >65 or <65 w/ hx of hip fracture): N/A  HbA1c: last 4.6 (4/2024)      Follow-up in 3 mo    Patient and plan discussed with attending physician Dr. Saucedo.    Maile Cruz MD     3 = A little assistance

## 2025-02-16 NOTE — PATIENT PROFILE ADULT - FALL HARM RISK - HARM RISK INTERVENTIONS

## 2025-02-16 NOTE — PROGRESS NOTE ADULT - SUBJECTIVE AND OBJECTIVE BOX
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 She reports falling and breaking her glasses.   OMFS was consulted for evaluation of left orbital floor fracture. Patient was evaluated by OMFS resident on call. At the time of the exam the patient was AAOx3 and in no acute pain or distress. The patient acted as the informant      Interval: NATALIIA SOW      Extraoral Exam:     H: Normocephalic. CN V1, V2, V3 grossly intact bilaterally (+pinprick, +brush stroke) with no evidence of CN7 nerve weakness. Inferior border of the mandible is palpable bilaterally. U shaped 4 cm repaired laceration to left zygomatic region  E: EOMI. Left sided periorbital edema, subconjunctival hemorrhage, ~40% hyphema, periorbital ecchymosis, irregular position of pupil  noted. Limited peripheral vision to left eye.    E: No otorrhea or Maurer's sign.    N: Nasal dorsum is midline with no cosmetic deformity. No edema, mobility or crepitus to nasal dorsum. Nares patent bilaterally with no rhinorrhea, active epistaxis or septal hematoma. Dried blood in left naris.   T: Trachea is midline with no palpable masses and no lymphadenopathy.   Intraoral Exam   LUMA >35mm with no deflection or deviation of the mandible upon opening or closing with no TMJ clicking or popping.  Removed maxillary RPD for evaluation. Maxilla and mandible are stable with no segmental mobility. No intraoral lacerations, abrasions, or contusions. No evidence of acute dentoalveolar trauma with no luxated or fractured teeth.   No FOM elevation and no sublingual hematoma. No pigmented, ulcerative or exophytic lesion. Airway is patent and intact. No Nikolay's sign and no vestibular ecchymosis with no tenderness to palpation. Fair oral hygiene with no sign of infection.   ?   Radiographic Exam   CT facial bones:  -Left orbital floor blowout fracture. No rectus muscle herniation.   Hemorrhage in the left maxillary sinus.    Vitals:     Vital Signs Last 24 Hrs  T(C): 36.8 (16 Feb 2025 07:59), Max: 36.9 (15 Feb 2025 19:57)  T(F): 98.2 (16 Feb 2025 07:59), Max: 98.5 (15 Feb 2025 19:57)  HR: 71 (16 Feb 2025 07:59) (66 - 88)  BP: 140/79 (16 Feb 2025 07:59) (114/61 - 160/68)  BP(mean): 75 (16 Feb 2025 06:54) (75 - 84)  RR: 16 (16 Feb 2025 07:59) (16 - 20)  SpO2: 98% (16 Feb 2025 07:59) (95% - 100%)    Parameters below as of 16 Feb 2025 07:59  Patient On (Oxygen Delivery Method): room air        I&O's Detail      Medications:    MEDICATIONS  (STANDING):  amoxicillin  875 milliGRAM(s)/clavulanate 1 Tablet(s) Oral two times a day  atenolol  Tablet 50 milliGRAM(s) Oral at bedtime  atorvastatin 10 milliGRAM(s) Oral at bedtime  cyclopentolate 1% Solution 1 Drop(s) Left EYE three times a day  gabapentin 100 milliGRAM(s) Oral at bedtime  ketorolac 0.5% Ophthalmic Solution 1 Drop(s) Right EYE at bedtime  losartan 25 milliGRAM(s) Oral daily    MEDICATIONS  (PRN):  acetaminophen     Tablet .. 650 milliGRAM(s) Oral every 6 hours PRN Temp greater or equal to 38C (100.4F), Mild Pain (1 - 3)      Labs:                          12.1   7.65  )-----------( 217      ( 16 Feb 2025 06:50 )             38.3       02-16    139  |  103  |  12  ----------------------------<  100[H]  4.1   |  20[L]  |  0.57    Ca    9.1      16 Feb 2025 05:45    TPro  6.5[L]  /  Alb  4.0  /  TBili  0.3[L]  /  DBili  x   /  AST  26  /  ALT  18  /  AlkPhos  77  02-15

## 2025-02-16 NOTE — PROGRESS NOTE ADULT - ASSESSMENT
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 Patient sustained left orbital floor blowout fracture and possible left globe laceration    Recommendations:  - No acute OMFS surgical intervention, will re-evaluate after ophthalmology manages globe and after resolution of edema   - Pain management as per primary team    - Antibiotics per ophthalmology   - Sinus precautions (no nose blowing, no sucking on straws, sneeze with mouth open)   - OMFS to follow while inpatient  - [facility] Lynne [facility]ina[facility] and call [phone] to schedule if discharged       Emre Lowe  Oral and Maxillofacial Surgery   [facility] Pager #[phone]   [facility] Pager: [phone]   [facility] Pager: [phone]   Available on Teams

## 2025-02-16 NOTE — PROGRESS NOTE ADULT - ASSESSMENT
69y female with a past medical history/ocular history of glaucoma OD, PCIOL OU, marfan's syndrome consulted for traumatic eye injury     #Trauma OS  #Bullous subconj Heme OS  #Hyphema OS  - Fell on floor and glasses broke giving her facial laceration  - Previously 20/25 OS per patient   - LP OU, pupils irregular OU  - IOP 4 OS, baseline pre prior records is 6 in left eye  - Raised subcong heme Supranasally, however patient has hx of thin bleb in left eye.   - Hyphema with 40% of globe OS  - Hard shield at all times  - Tetanus shot if not already given   - Will opt for observation vs surgery at this time. Patient is high risk given marfan hx and very thin slera OU. No overt signs of rupture. AC well formed, Bruce negative over cornea and bleb, IOP at baseline IOP.   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   -  Chief resident Dr. Pitt     Patient will need to be seen for repeat exam tomorrow either in clinic if she is discharged or in hospital if she is still at [facility].     [address] [facility] [facility] [address]  [address]

## 2025-02-16 NOTE — ED ADULT NURSE REASSESSMENT NOTE - NS ED NURSE REASSESS COMMENT FT1
Pt. resting in bed, respirations even and unlabored, chest rise equal bilaterally. Pt. states soreness to the left periorbital space. Pt. states poor vision with seeing "shapes and shadows" with color from the left eye. Left eye noted to have blood in the sclera. Pt. noted to have well approximated laceration under left eye. Medication administered as prescribed. Comfort measures provided, safety maintained throughout.

## 2025-02-17 ENCOUNTER — TRANSCRIPTION ENCOUNTER (OUTPATIENT)
Age: 70
End: 2025-02-17

## 2025-02-17 VITALS
DIASTOLIC BLOOD PRESSURE: 65 MMHG | HEART RATE: 71 BPM | SYSTOLIC BLOOD PRESSURE: 110 MMHG | TEMPERATURE: 98 F | RESPIRATION RATE: 17 BRPM | OXYGEN SATURATION: 98 %

## 2025-02-17 LAB
ANION GAP SERPL CALC-SCNC: 14 MMOL/L — SIGNIFICANT CHANGE UP (ref 7–14)
BUN SERPL-MCNC: 16 MG/DL — SIGNIFICANT CHANGE UP (ref 7–23)
CALCIUM SERPL-MCNC: 9 MG/DL — SIGNIFICANT CHANGE UP (ref 8.4–10.5)
CHLORIDE SERPL-SCNC: 105 MMOL/L — SIGNIFICANT CHANGE UP (ref 98–107)
CO2 SERPL-SCNC: 23 MMOL/L — SIGNIFICANT CHANGE UP (ref 22–31)
CREAT SERPL-MCNC: 0.63 MG/DL — SIGNIFICANT CHANGE UP (ref 0.5–1.3)
EGFR: 95 ML/MIN/1.73M2 — SIGNIFICANT CHANGE UP
GLUCOSE SERPL-MCNC: 99 MG/DL — SIGNIFICANT CHANGE UP (ref 70–99)
HCT VFR BLD CALC: 37.5 % — SIGNIFICANT CHANGE UP (ref 34.5–45)
HGB BLD-MCNC: 12.2 G/DL — SIGNIFICANT CHANGE UP (ref 11.5–15.5)
MCHC RBC-ENTMCNC: 27.7 PG — SIGNIFICANT CHANGE UP (ref 27–34)
MCHC RBC-ENTMCNC: 32.5 G/DL — SIGNIFICANT CHANGE UP (ref 32–36)
MCV RBC AUTO: 85 FL — SIGNIFICANT CHANGE UP (ref 80–100)
NRBC # BLD AUTO: 0 K/UL — SIGNIFICANT CHANGE UP (ref 0–0)
NRBC # FLD: 0 K/UL — SIGNIFICANT CHANGE UP (ref 0–0)
NRBC BLD AUTO-RTO: 0 /100 WBCS — SIGNIFICANT CHANGE UP (ref 0–0)
PLATELET # BLD AUTO: 210 K/UL — SIGNIFICANT CHANGE UP (ref 150–400)
POTASSIUM SERPL-MCNC: 3.9 MMOL/L — SIGNIFICANT CHANGE UP (ref 3.5–5.3)
POTASSIUM SERPL-SCNC: 3.9 MMOL/L — SIGNIFICANT CHANGE UP (ref 3.5–5.3)
RBC # BLD: 4.41 M/UL — SIGNIFICANT CHANGE UP (ref 3.8–5.2)
RBC # FLD: 14 % — SIGNIFICANT CHANGE UP (ref 10.3–14.5)
SODIUM SERPL-SCNC: 142 MMOL/L — SIGNIFICANT CHANGE UP (ref 135–145)
WBC # BLD: 6.37 K/UL — SIGNIFICANT CHANGE UP (ref 3.8–10.5)
WBC # FLD AUTO: 6.37 K/UL — SIGNIFICANT CHANGE UP (ref 3.8–10.5)

## 2025-02-17 PROCEDURE — 99239 HOSP IP/OBS DSCHRG MGMT >30: CPT

## 2025-02-17 PROCEDURE — 73560 X-RAY EXAM OF KNEE 1 OR 2: CPT | Mod: 26,RT

## 2025-02-17 RX ORDER — AMOXICILLIN AND CLAVULANATE POTASSIUM 500; 125 MG/1; MG/1
1 TABLET, FILM COATED ORAL
Qty: 12 | Refills: 0
Start: 2025-02-17 | End: 2025-02-22

## 2025-02-17 RX ORDER — CHLOROQUINE PHOSPHATE
1 POWDER (GRAM) MISCELLANEOUS
Qty: 10 | Refills: 0
Start: 2025-02-17 | End: 2025-03-18

## 2025-02-17 RX ORDER — ACETAMINOPHEN 500 MG/5ML
2 LIQUID (ML) ORAL
Qty: 240 | Refills: 0
Start: 2025-02-17 | End: 2025-03-18

## 2025-02-17 RX ORDER — HYPROMELLOSE 0.4 %
1 DROPS OPHTHALMIC (EYE)
Refills: 0 | DISCHARGE

## 2025-02-17 RX ADMIN — KETOROLAC TROMETHAMINE 1 DROP(S): 5 SOLUTION/ DROPS OPHTHALMIC at 00:18

## 2025-02-17 RX ADMIN — AMOXICILLIN AND CLAVULANATE POTASSIUM 1 TABLET(S): 500; 125 TABLET, FILM COATED ORAL at 17:01

## 2025-02-17 RX ADMIN — LOSARTAN POTASSIUM 25 MILLIGRAM(S): 100 TABLET, FILM COATED ORAL at 06:06

## 2025-02-17 RX ADMIN — AMOXICILLIN AND CLAVULANATE POTASSIUM 1 TABLET(S): 500; 125 TABLET, FILM COATED ORAL at 06:05

## 2025-02-17 RX ADMIN — Medication 1 DROP(S): at 14:46

## 2025-02-17 RX ADMIN — Medication 1 DROP(S): at 06:06

## 2025-02-17 NOTE — PHYSICAL THERAPY INITIAL EVALUATION ADULT - PERTINENT HX OF CURRENT PROBLEM, REHAB EVAL
[Time Spent: ___ minutes] : I have spent [unfilled] minutes of time on the encounter.
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 Patient sustained left orbital floor blowout fracture and possible left globe laceration

## 2025-02-17 NOTE — DISCHARGE NOTE PROVIDER - HOSPITAL COURSE
69F Marfan syndrome, glaucoma, HTN, HLD, Rt lens dislocation - chronically blind who initially presented s/p mech fall w/ loss of vision to L eye to [facility], there found to have L orbital floor blowout fracture with c/f possible ruptured globe, transferred to [facility] ER for ophtho eval. OMFS and opthalmology following. PT evaluated , recommends subacute rehab, discussed with patient at length with son Fabricio at bedside. At this time patient does not want to go subacute rehab, opted for home services. CM made aware. Pending XR right knee to evaluate swelling that started 2/17,   Opthalmology made aware and will evaluate patient in house today. Medications sent to patients preferred pharmacy in prep for discharge.   69F Marfan syndrome, glaucoma, HTN, HLD, Rt lens dislocation - chronically blind who initially presented s/p mech fall w/ loss of vision to L eye to [facility], there found to have L orbital floor blowout fracture with c/f possible ruptured globe, transferred to [facility] ER for ophtho eval. OMFS and opthalmology following. PT evaluated , recommends subacute rehab, discussed with patient at length with son Fabricio at bedside. At this time patient does not want to go subacute rehab, opted for home services. CM made aware. Pending XR right knee to evaluate swelling that started 2/17, Opthalmology team made aware and will evaluate patient in house today. Medications sent to patients preferred pharmacy in prep for discharge.   69F Marfan syndrome, glaucoma, HTN, HLD, Rt lens dislocation - chronically blind who initially presented s/p mech fall w/ loss of vision to L eye to [facility], there found to have L orbital floor blowout fracture with c/f possible ruptured globe, transferred to [facility] ER for ophtho eval. OMFS and opthalmology following. PT evaluated , recommends subacute rehab, discussed with patient at length with son Fabricio at bedside. At this time patient does not want to go subacute rehab, opted for home services. CM made aware. Pending XR right knee to evaluate swelling that started 2/17, Opthalmology team made aware and will evaluate patient in house today. Medications sent to patients preferred pharmacy in prep for discharge.  Pt seen by Dr. Curry and cleared for dc  Dispo Home with home care pending      69F Marfan syndrome, glaucoma, HTN, HLD, Rt lens dislocation - chronically blind who initially presented s/p mech fall w/ loss of vision to L eye to [facility], there found to have L orbital floor blowout fracture with c/f possible ruptured globe, transferred to [facility] ER for ophtho eval. OMFS and opthalmology following. PT evaluated , recommends subacute rehab, discussed with patient at length with son Fabricio at bedside. At this time patient does not want to go subacute rehab, opted for home services. CM made aware. XR right knee to evaluate swelling that started 2/17, negative for acute fracture or dislocation and shows questionable small knee joint effusion. Given meds for pain control. Opthalmology evaluated patient in house on 2/17, cleared patient, report that they will call patient with time for outpatient follow up on February 18,2025. Medications sent to patients preferred pharmacy in prep for discharge.    Pt seen by Dr. Curry and cleared for discharge.  Dispo Home with home care pending

## 2025-02-17 NOTE — DISCHARGE NOTE PROVIDER - NSDCFUADDAPPT_GEN_ALL_CORE_FT
[facility] [address]  [address]    Patient to follow up in outpatient [facility] clinic (Dr. Lynne Ospina) and call [phone] to schedule a follow up appointment    Follow up at opthalmology clinic   [address] [place] [address]  [address]    Patient to follow up in outpatient [facility] clinic (Dr. Lynne Ospina) and call [phone] to schedule a follow up appointment

## 2025-02-17 NOTE — DISCHARGE NOTE NURSING/CASE MANAGEMENT/SOCIAL WORK - NSDCFUADDAPPT_GEN_ALL_CORE_FT
Follow up at opthalmology clinic   [address] [place] [address]  [address]    Patient to follow up in outpatient [facility] clinic (Dr. Lynne Ospina) and call [phone] to schedule a follow up appointment

## 2025-02-17 NOTE — PROGRESS NOTE ADULT - REASON FOR ADMISSION
Transfer for ophtho eval, L globe lac and infraorbital fx

## 2025-02-17 NOTE — DISCHARGE NOTE NURSING/CASE MANAGEMENT/SOCIAL WORK - NSDCVIVACCINE_GEN_ALL_CORE_FT
Td (adult) preservative free; 15-Feb-2025 16:01; Melba Mendoza (RN); [employer]; [id number] (Exp. Date: 15-Apr-2026); IntraMuscular; Deltoid Left.; 0.5 milliLiter(s); VIS (VIS Published: 15-Apr-2026, VIS Presented: 15-Feb-2025);

## 2025-02-17 NOTE — DISCHARGE NOTE PROVIDER - NSDCMRMEDTOKEN_GEN_ALL_CORE_FT
atenolol 50 mg oral tablet: 1 tab(s) orally once a day  gabapentin 100 mg oral tablet: orally once a day (at bedtime)  ketorolac 0.5% ophthalmic solution: 1 drop(s) in each affected eye once a day (at bedtime) to R eye  losartan 25 mg oral tablet: 1 tab(s) orally once a day  pravastatin 20 mg oral tablet: 1 tab(s) orally once a day  Refresh Optive ophthalmic solution: 1 drop(s) in each eye 2 times a day  sodium chloride, hypertonic, ophthalmic: 1 drop(s) intraocular 4 times a day to R eye   acetaminophen 325 mg oral tablet: 2 tab(s) orally every 6 hours as needed for  moderate pain  Afrin 0.05% nasal spray: 2 spray(s) in each nostril 2 times a day  amoxicillin-clavulanate 875 mg-125 mg oral tablet: 1 tab(s) orally 2 times a day  atenolol 50 mg oral tablet: 1 tab(s) orally once a day  cyclopentolate 1% ophthalmic solution: 1 drop(s) to each affected eye 3 times a day  gabapentin 100 mg oral tablet: orally once a day (at bedtime)  ketorolac 0.5% ophthalmic solution: 1 drop(s) in each affected eye once a day (at bedtime) to R eye  losartan 25 mg oral tablet: 1 tab(s) orally once a day  pravastatin 20 mg oral tablet: 1 tab(s) orally once a day  sodium chloride, hypertonic, ophthalmic: 1 drop(s) intraocular 4 times a day to R eye   acetaminophen 325 mg oral tablet: 2 tab(s) orally every 6 hours as needed for  moderate pain  Afrin 0.05% nasal spray: 2 spray(s) in each nostril 2 times a day  amoxicillin-clavulanate 875 mg-125 mg oral tablet: 1 tab(s) orally 2 times a day  atenolol 50 mg oral tablet: 1 tab(s) orally once a day  cyclopentolate 1% ophthalmic solution: 1 drop(s) to each affected eye 3 times a day  gabapentin 100 mg oral tablet: orally once a day (at bedtime)  ketorolac 0.5% ophthalmic solution: 1 drop(s) in each affected eye once a day (at bedtime) to R eye  losartan 25 mg oral tablet: 1 tab(s) orally once a day  pravastatin 20 mg oral tablet: 1 tab(s) orally once a day  PT/OT eval: Please eval for PT /OT needs   Dx R knee pain  sodium chloride, hypertonic, ophthalmic: 1 drop(s) intraocular 4 times a day to R eye

## 2025-02-17 NOTE — PROGRESS NOTE ADULT - SUBJECTIVE AND OBJECTIVE BOX
PROGRESS NOTE:     Patient is a 69y old  Female who presents with a chief complaint of Transfer for ophtho eval, L globe lac and infraorbital fx (17 Feb 2025 08:28)      SUBJECTIVE / OVERNIGHT EVENTS: +right knee pain started this am. pending XR   NAEON.    ADDITIONAL REVIEW OF SYSTEMS:    MEDICATIONS  (STANDING):  amoxicillin  875 milliGRAM(s)/clavulanate 1 Tablet(s) Oral two times a day  atenolol  Tablet 50 milliGRAM(s) Oral at bedtime  atorvastatin 10 milliGRAM(s) Oral at bedtime  cyclopentolate 1% Solution 1 Drop(s) Left EYE three times a day  gabapentin 100 milliGRAM(s) Oral at bedtime  ketorolac 0.5% Ophthalmic Solution 1 Drop(s) Right EYE at bedtime  losartan 25 milliGRAM(s) Oral daily    MEDICATIONS  (PRN):  acetaminophen     Tablet .. 650 milliGRAM(s) Oral every 6 hours PRN Temp greater or equal to 38C (100.4F), Mild Pain (1 - 3)      CAPILLARY BLOOD GLUCOSE        I&O's Summary    16 Feb 2025 07:01  -  17 Feb 2025 07:00  --------------------------------------------------------  IN: 240 mL / OUT: 0 mL / NET: 240 mL        PHYSICAL EXAM:  Vital Signs Last 24 Hrs  T(C): 36.7 (17 Feb 2025 06:19), Max: 36.8 (16 Feb 2025 16:53)  T(F): 98.1 (17 Feb 2025 06:19), Max: 98.2 (16 Feb 2025 16:53)  HR: 84 (17 Feb 2025 06:19) (79 - 84)  BP: 106/60 (17 Feb 2025 06:19) (106/60 - 118/62)  BP(mean): --  RR: 16 (17 Feb 2025 06:19) (16 - 18)  SpO2: 98% (17 Feb 2025 06:19) (98% - 100%)    Parameters below as of 17 Feb 2025 06:19  Patient On (Oxygen Delivery Method): room air        CONSTITUTIONAL: NAD, well-developed  RESPIRATORY: Normal respiratory effort; lungs are clear to auscultation bilaterally  CARDIOVASCULAR: Regular rate and rhythm, normal S1 and S2, no murmur/rub/gallop; No lower extremity edema; Peripheral pulses are 2+ bilaterally  ABDOMEN: Nontender to palpation, normoactive bowel sounds, no rebound/guarding; No hepatosplenomegaly  MUSCLOSKELETAL: no clubbing or cyanosis of digits; no joint swelling or tenderness to palpation  PSYCH: A+O to person, place, and time; affect appropriate  NEURO:  SKIN:    LABS:                        12.2   6.37  )-----------( 210      ( 17 Feb 2025 06:00 )             37.5     02-17    142  |  105  |  16  ----------------------------<  99  3.9   |  23  |  0.63    Ca    9.0      17 Feb 2025 06:00    TPro  6.5[L]  /  Alb  4.0  /  TBili  0.3[L]  /  DBili  x   /  AST  26  /  ALT  18  /  AlkPhos  77  02-15    PT/INR - ( 15 Feb 2025 15:00 )   PT: 11.6 sec;   INR: 1.00 ratio               Urinalysis Basic - ( 17 Feb 2025 06:00 )    Color: x / Appearance: x / SG: x / pH: x  Gluc: 99 mg/dL / Ketone: x  / Bili: x / Urobili: x   Blood: x / Protein: x / Nitrite: x   Leuk Esterase: x / RBC: x / WBC x   Sq Epi: x / Non Sq Epi: x / Bacteria: x          RADIOLOGY & ADDITIONAL TESTS:  Results Reviewed:   Imaging Personally Reviewed:  Electrocardiogram Personally Reviewed:    COORDINATION OF CARE:  Care Discussed with Consultants/Other Providers [Y/N]:  Prior or Outpatient Records Reviewed [Y/N]:   PROGRESS NOTE:     Patient is a 69y old  Female who presents with a chief complaint of Transfer for ophtho eval, L globe lac and infraorbital fx (17 Feb 2025 08:28)      SUBJECTIVE / OVERNIGHT EVENTS: +right knee pain started this am. pending XR Right knee     ADDITIONAL REVIEW OF SYSTEMS:    MEDICATIONS  (STANDING):  amoxicillin  875 milliGRAM(s)/clavulanate 1 Tablet(s) Oral two times a day  atenolol  Tablet 50 milliGRAM(s) Oral at bedtime  atorvastatin 10 milliGRAM(s) Oral at bedtime  cyclopentolate 1% Solution 1 Drop(s) Left EYE three times a day  gabapentin 100 milliGRAM(s) Oral at bedtime  ketorolac 0.5% Ophthalmic Solution 1 Drop(s) Right EYE at bedtime  losartan 25 milliGRAM(s) Oral daily    MEDICATIONS  (PRN):  acetaminophen     Tablet .. 650 milliGRAM(s) Oral every 6 hours PRN Temp greater or equal to 38C (100.4F), Mild Pain (1 - 3)      CAPILLARY BLOOD GLUCOSE        I&O's Summary    16 Feb 2025 07:01  -  17 Feb 2025 07:00  --------------------------------------------------------  IN: 240 mL / OUT: 0 mL / NET: 240 mL        PHYSICAL EXAM:  Vital Signs Last 24 Hrs  T(C): 36.7 (17 Feb 2025 06:19), Max: 36.8 (16 Feb 2025 16:53)  T(F): 98.1 (17 Feb 2025 06:19), Max: 98.2 (16 Feb 2025 16:53)  HR: 84 (17 Feb 2025 06:19) (79 - 84)  BP: 106/60 (17 Feb 2025 06:19) (106/60 - 118/62)  BP(mean): --  RR: 16 (17 Feb 2025 06:19) (16 - 18)  SpO2: 98% (17 Feb 2025 06:19) (98% - 100%)    Parameters below as of 17 Feb 2025 06:19  Patient On (Oxygen Delivery Method): room air        CONSTITUTIONAL: NAD, well-developed  RESPIRATORY: Normal respiratory effort; lungs are clear to auscultation bilaterally  CARDIOVASCULAR: Regular rate and rhythm, normal S1 and S2, no murmur/rub/gallop; No lower extremity edema; Peripheral pulses are 2+ bilaterally  ABDOMEN: Nontender to palpation, normoactive bowel sounds, no rebound/guarding; No hepatosplenomegaly  MUSCLOSKELETAL: no clubbing or cyanosis of digits; +Right knee swelling and tenderness.  NEURO AOx3    LABS:                        12.2   6.37  )-----------( 210      ( 17 Feb 2025 06:00 )             37.5     02-17    142  |  105  |  16  ----------------------------<  99  3.9   |  23  |  0.63    Ca    9.0      17 Feb 2025 06:00    TPro  6.5[L]  /  Alb  4.0  /  TBili  0.3[L]  /  DBili  x   /  AST  26  /  ALT  18  /  AlkPhos  77  02-15    PT/INR - ( 15 Feb 2025 15:00 )   PT: 11.6 sec;   INR: 1.00 ratio               Urinalysis Basic - ( 17 Feb 2025 06:00 )    Color: x / Appearance: x / SG: x / pH: x  Gluc: 99 mg/dL / Ketone: x  / Bili: x / Urobili: x   Blood: x / Protein: x / Nitrite: x   Leuk Esterase: x / RBC: x / WBC x   Sq Epi: x / Non Sq Epi: x / Bacteria: x          RADIOLOGY & ADDITIONAL TESTS:  Results Reviewed:   Imaging Personally Reviewed:  Electrocardiogram Personally Reviewed:    COORDINATION OF CARE:  Care Discussed with Consultants/Other Providers [Y/N]:  Prior or Outpatient Records Reviewed [Y/N]:

## 2025-02-17 NOTE — DISCHARGE NOTE PROVIDER - CARE PROVIDER_API CALL
Angelo Jaimes  [facility]  [address], [address]  [place] [address]  Phone: [phone]  Fax: [phone]  Follow Up Time: 1 week    Lynne Ospina  Oral/Maxillofacial Surgery  [address], [address]  [place] [address]  Phone: [phone]  Fax: [phone]  Follow Up Time: 1 week

## 2025-02-17 NOTE — PROGRESS NOTE ADULT - PROBLEM SELECTOR PLAN 2
- appreciate optho recs- to see patient today  - hard shield at all times  - start cyclogyl in L eye
- appreciate optho recs- to see patient today  - hard shield at all times  - start cyclogyl in L eye

## 2025-02-17 NOTE — PROGRESS NOTE ADULT - PROBLEM SELECTOR PLAN 1
No entrapment, double vision, or pain w/ OMS.  - appreciate optho  - follow up final OMFS recs prior to discharge in AM  - sinus precautions  - start augmentin, continue upon discharge  - afrin upon discharge No entrapment, double vision, or pain w/ OMS.  - appreciate optho  - follow up final OMFS recs prior to discharge in AM  - sinus precautions  - start augmentin, continue upon discharge  - afrin upon discharge  --meds sent to RX  [facility] -2/17

## 2025-02-17 NOTE — DISCHARGE NOTE PROVIDER - PROVIDER TOKENS
PROVIDER:[TOKEN:[[id number]],FOLLOWUP:[1 week]],PROVIDER:[TOKEN:[[id number]],FOLLOWUP:[1 week]]

## 2025-02-17 NOTE — PHYSICAL THERAPY INITIAL EVALUATION ADULT - ADL SKILLS, REHAB EVAL
independent
Comprehensive Dental Treatment under anesthesia   pre- op Instructions discussed   Labs sent  medical eval on file  morning meds with sips of water

## 2025-02-17 NOTE — DISCHARGE NOTE PROVIDER - NSDCFUSCHEDAPPT_GEN_ALL_CORE_FT
Xin, Angelo NDIAYE  [facility]  ORTHOSURG [address]  Scheduled Appointment: 03/17/2025

## 2025-02-17 NOTE — DISCHARGE NOTE PROVIDER - NSDCACTIVITY_GEN_ALL_CORE
Activity as tolerated Do not drive or operate machinery/Follow Instructions Provided by your Surgical Team

## 2025-02-17 NOTE — DISCHARGE NOTE PROVIDER - ATTENDING DISCHARGE PHYSICAL EXAMINATION:
Physical exam on 2/17  CONSTITUTIONAL: Well groomed  EYES: patch over left eye, left side periorbital edema and ecchymosis, subconjunctival hemorrhage  ENMT: Oral mucosa with moist membranes. Normal dentition; no pharyngeal injection or exudates  NECK: Supple, symmetric and without tracheal deviation   RESP: No respiratory distress, no use of accessory muscles; CTA b/l, no WRR  CV: RRR, +S1S2, no MRG; no JVD; no peripheral edema  GI: Soft, NT, ND, no rebound, no guarding; no palpable masses  MSK: +right knee swelling  NEURO: AOx3, no focal deficits

## 2025-02-17 NOTE — PROGRESS NOTE ADULT - SUBJECTIVE AND OBJECTIVE BOX
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 She reports falling and breaking her glasses.   Sustained left orbital floor fracture.     Interval: NAEON, AVSS      O/E:   H: Normocephalic. CN V1, V2, V3 grossly intact bilaterally (+pinprick, +brush stroke) with no evidence of CN7 nerve weakness. Inferior border of the mandible is palpable bilaterally. U shaped 4 cm repaired laceration to left zygomatic region  E: EOMI. Left sided periorbital edema, subconjunctival hemorrhage, ~40% hyphema, periorbital ecchymosis, irregular position of pupil  noted. Limited peripheral vision to left eye.    E: No otorrhea or Maurer's sign.    N: Nasal dorsum is midline with no cosmetic deformity. No edema, mobility or crepitus to nasal dorsum. Nares patent bilaterally with no rhinorrhea, active epistaxis or septal hematoma. Dried blood in left naris.   T: Trachea is midline with no palpable masses and no lymphadenopathy.   Intraoral Exam   LUMA >35mm with no deflection or deviation of the mandible upon opening or closing with no TMJ clicking or popping.  Removed maxillary RPD for evaluation. Maxilla and mandible are stable with no segmental mobility. No intraoral lacerations, abrasions, or contusions. No evidence of acute dentoalveolar trauma with no luxated or fractured teeth.   No FOM elevation and no sublingual hematoma. No pigmented, ulcerative or exophytic lesion. Airway is patent and intact. No Nikolay's sign and no vestibular ecchymosis with no tenderness to palpation. Fair oral hygiene with no sign of infection.     Vitals:     Vital Signs Last 24 Hrs  T(C): 36.7 (17 Feb 2025 06:19), Max: 36.8 (16 Feb 2025 16:53)  T(F): 98.1 (17 Feb 2025 06:19), Max: 98.2 (16 Feb 2025 16:53)  HR: 84 (17 Feb 2025 06:19) (79 - 84)  BP: 106/60 (17 Feb 2025 06:19) (106/60 - 124/58)  BP(mean): --  RR: 16 (17 Feb 2025 06:19) (16 - 18)  SpO2: 98% (17 Feb 2025 06:19) (98% - 100%)    Parameters below as of 17 Feb 2025 06:19  Patient On (Oxygen Delivery Method): room air        I&O's Detail    16 Feb 2025 07:01  -  17 Feb 2025 07:00  --------------------------------------------------------  IN:    Oral Fluid: 240 mL  Total IN: 240 mL    OUT:  Total OUT: 0 mL    Total NET: 240 mL          Medications:    MEDICATIONS  (STANDING):  amoxicillin  875 milliGRAM(s)/clavulanate 1 Tablet(s) Oral two times a day  atenolol  Tablet 50 milliGRAM(s) Oral at bedtime  atorvastatin 10 milliGRAM(s) Oral at bedtime  cyclopentolate 1% Solution 1 Drop(s) Left EYE three times a day  gabapentin 100 milliGRAM(s) Oral at bedtime  ketorolac 0.5% Ophthalmic Solution 1 Drop(s) Right EYE at bedtime  losartan 25 milliGRAM(s) Oral daily    MEDICATIONS  (PRN):  acetaminophen     Tablet .. 650 milliGRAM(s) Oral every 6 hours PRN Temp greater or equal to 38C (100.4F), Mild Pain (1 - 3)      Labs:                          12.2   6.37  )-----------( 210      ( 17 Feb 2025 06:00 )             37.5       02-16    139  |  103  |  12  ----------------------------<  100[H]  4.1   |  20[L]  |  0.57    Ca    9.1      16 Feb 2025 05:45    TPro  6.5[L]  /  Alb  4.0  /  TBili  0.3[L]  /  DBili  x   /  AST  26  /  ALT  18  /  AlkPhos  77  02-15

## 2025-02-17 NOTE — DISCHARGE NOTE NURSING/CASE MANAGEMENT/SOCIAL WORK - FINANCIAL ASSISTANCE
[facility] provides services at a reduced cost to those who are determined to be eligible through [facility]’s financial assistance program. Information regarding [facility]’s financial assistance program can be found by going to [email or web address] or by calling [phone].

## 2025-02-17 NOTE — PROGRESS NOTE ADULT - ASSESSMENT
69F Marfan syndrome, glaucoma, HTN, HLD, Rt lens dislocation - chronically blind who intially presented s/p mech fall w/ loss of vision to L eye to [facility], there found to have L orbital floor blowout fracture with c/f possible ruptured globe, transferred to [facility] ER for ophtho eval. OMFS and opthalmology following.   69F Marfan syndrome, glaucoma, HTN, HLD, Rt lens dislocation - chronically blind who initially presented s/p mech fall w/ loss of vision to L eye to [facility], there found to have L orbital floor blowout fracture with c/f possible ruptured globe, transferred to [facility] ER for ophtho eval. OMFS and opthalmology following. PT evaluated , recommends subacute rehab, discussed with patient at length with son Fabricio at bedside. At this time patient does not want to go subacute rehab, opted for home services. CM made aware. Pending XR right knee to evaluate swelling that started this am. Opthalmology made aware and will evaluate patient in house today. Medications sent to patients preferred pharmacy in prep for discharge.

## 2025-02-17 NOTE — DISCHARGE NOTE NURSING/CASE MANAGEMENT/SOCIAL WORK - PATIENT PORTAL LINK FT
You can access the FollowMyHealth Patient Portal offered by [facility] by registering at the following website: [email or web address]. By joining [employer]’s FollowMyHealth portal, you will also be able to view your health information using other applications (apps) compatible with our system.

## 2025-02-17 NOTE — PHYSICAL THERAPY INITIAL EVALUATION ADULT - GAIT DEVIATIONS NOTED, PT EVAL
on right LE/decreased velocity of limb motion/decreased step length/decreased weight-shifting ability

## 2025-02-18 ENCOUNTER — APPOINTMENT (OUTPATIENT)
Dept: OPHTHALMOLOGY | Facility: CLINIC | Age: 70
End: 2025-02-18
Payer: MEDICARE

## 2025-02-18 ENCOUNTER — NON-APPOINTMENT (OUTPATIENT)
Age: 70
End: 2025-02-18

## 2025-02-18 PROCEDURE — 76512 OPH US DX B-SCAN: CPT | Mod: LT

## 2025-02-18 PROCEDURE — 92002 INTRM OPH EXAM NEW PATIENT: CPT

## 2025-02-20 ENCOUNTER — NON-APPOINTMENT (OUTPATIENT)
Age: 70
End: 2025-02-20

## 2025-02-20 ENCOUNTER — APPOINTMENT (OUTPATIENT)
Dept: OPHTHALMOLOGY | Facility: CLINIC | Age: 70
End: 2025-02-20
Payer: MEDICARE

## 2025-02-20 PROCEDURE — 92012 INTRM OPH EXAM EST PATIENT: CPT

## 2025-02-25 ENCOUNTER — NON-APPOINTMENT (OUTPATIENT)
Age: 70
End: 2025-02-25

## 2025-02-25 ENCOUNTER — APPOINTMENT (OUTPATIENT)
Dept: OPHTHALMOLOGY | Facility: CLINIC | Age: 70
End: 2025-02-25
Payer: MEDICARE

## 2025-02-25 PROCEDURE — 92012 INTRM OPH EXAM EST PATIENT: CPT

## 2025-02-25 PROCEDURE — 92285 EXTERNAL OCULAR PHOTOGRAPHY: CPT

## 2025-02-25 PROCEDURE — 76512 OPH US DX B-SCAN: CPT | Mod: LT

## 2025-02-27 ENCOUNTER — APPOINTMENT (OUTPATIENT)
Age: 70
End: 2025-02-27

## 2025-02-27 PROCEDURE — 99204 OFFICE O/P NEW MOD 45 MIN: CPT

## 2025-03-17 ENCOUNTER — APPOINTMENT (OUTPATIENT)
Dept: ORTHOPEDIC SURGERY | Facility: CLINIC | Age: 70
End: 2025-03-17
Payer: MEDICARE

## 2025-03-17 VITALS
BODY MASS INDEX: 27.2 KG/M2 | HEART RATE: 56 BPM | DIASTOLIC BLOOD PRESSURE: 68 MMHG | HEIGHT: 70 IN | WEIGHT: 190 LBS | SYSTOLIC BLOOD PRESSURE: 120 MMHG

## 2025-03-17 DIAGNOSIS — M17.11 UNILATERAL PRIMARY OSTEOARTHRITIS, RIGHT KNEE: ICD-10-CM

## 2025-03-17 PROCEDURE — 99204 OFFICE O/P NEW MOD 45 MIN: CPT | Mod: 25

## 2025-03-17 PROCEDURE — 20610 DRAIN/INJ JOINT/BURSA W/O US: CPT | Mod: RT

## 2025-03-17 PROCEDURE — 73564 X-RAY EXAM KNEE 4 OR MORE: CPT | Mod: RT

## 2025-05-22 ENCOUNTER — APPOINTMENT (OUTPATIENT)
Dept: ORTHOPEDIC SURGERY | Facility: CLINIC | Age: 70
End: 2025-05-22
Payer: MEDICARE

## 2025-05-22 DIAGNOSIS — M17.11 UNILATERAL PRIMARY OSTEOARTHRITIS, RIGHT KNEE: ICD-10-CM

## 2025-05-22 PROCEDURE — 99214 OFFICE O/P EST MOD 30 MIN: CPT

## 2025-06-19 ENCOUNTER — NON-APPOINTMENT (OUTPATIENT)
Age: 70
End: 2025-06-19

## 2025-08-06 ENCOUNTER — NON-APPOINTMENT (OUTPATIENT)
Age: 70
End: 2025-08-06

## 2025-08-07 ENCOUNTER — NON-APPOINTMENT (OUTPATIENT)
Age: 70
End: 2025-08-07

## 2025-08-08 ENCOUNTER — APPOINTMENT (OUTPATIENT)
Dept: OBGYN | Facility: CLINIC | Age: 70
End: 2025-08-08
Payer: MEDICARE

## 2025-08-08 VITALS
HEIGHT: 70 IN | DIASTOLIC BLOOD PRESSURE: 70 MMHG | BODY MASS INDEX: 26.92 KG/M2 | SYSTOLIC BLOOD PRESSURE: 126 MMHG | WEIGHT: 188 LBS

## 2025-08-08 DIAGNOSIS — Z78.0 ASYMPTOMATIC MENOPAUSAL STATE: ICD-10-CM

## 2025-08-08 DIAGNOSIS — Z01.419 ENCOUNTER FOR GYNECOLOGICAL EXAMINATION (GENERAL) (ROUTINE) W/OUT ABNORMAL FINDINGS: ICD-10-CM

## 2025-08-08 DIAGNOSIS — R32 UNSPECIFIED URINARY INCONTINENCE: ICD-10-CM

## 2025-08-08 DIAGNOSIS — R39.15 URGENCY OF URINATION: ICD-10-CM

## 2025-08-08 PROCEDURE — 99397 PER PM REEVAL EST PAT 65+ YR: CPT

## 2025-08-08 PROCEDURE — 99214 OFFICE O/P EST MOD 30 MIN: CPT | Mod: 25

## 2025-08-08 PROCEDURE — 99459 PELVIC EXAMINATION: CPT

## 2025-08-11 LAB
C TRACH RRNA SPEC QL NAA+PROBE: NOT DETECTED
HPV HIGH+LOW RISK DNA PNL CVX: NOT DETECTED
N GONORRHOEA RRNA SPEC QL NAA+PROBE: NOT DETECTED
SOURCE TP AMPLIFICATION: NORMAL

## 2025-08-13 LAB — CYTOLOGY CVX/VAG DOC THIN PREP: NORMAL

## 2025-08-25 ENCOUNTER — APPOINTMENT (OUTPATIENT)
Dept: ORTHOPEDIC SURGERY | Facility: CLINIC | Age: 70
End: 2025-08-25

## 2025-08-25 VITALS
WEIGHT: 188 LBS | SYSTOLIC BLOOD PRESSURE: 118 MMHG | DIASTOLIC BLOOD PRESSURE: 70 MMHG | BODY MASS INDEX: 26.92 KG/M2 | HEART RATE: 62 BPM | HEIGHT: 70 IN

## 2025-08-25 DIAGNOSIS — M17.12 UNILATERAL PRIMARY OSTEOARTHRITIS, LEFT KNEE: ICD-10-CM

## 2025-08-25 DIAGNOSIS — M17.11 UNILATERAL PRIMARY OSTEOARTHRITIS, RIGHT KNEE: ICD-10-CM

## 2025-09-16 ENCOUNTER — APPOINTMENT (OUTPATIENT)
Dept: ORTHOPEDIC SURGERY | Facility: HOSPITAL | Age: 70
End: 2025-09-16